# Patient Record
Sex: FEMALE | Race: WHITE | Employment: FULL TIME | URBAN - METROPOLITAN AREA
[De-identification: names, ages, dates, MRNs, and addresses within clinical notes are randomized per-mention and may not be internally consistent; named-entity substitution may affect disease eponyms.]

---

## 2018-02-28 ENCOUNTER — HOSPITAL ENCOUNTER (OUTPATIENT)
Dept: MAMMOGRAPHY | Age: 43
Discharge: HOME | End: 2018-02-28
Attending: FAMILY MEDICINE
Payer: COMMERCIAL

## 2018-02-28 DIAGNOSIS — Z12.31 ENCOUNTER FOR SCREENING MAMMOGRAM FOR MALIGNANT NEOPLASM OF BREAST: ICD-10-CM

## 2018-02-28 PROCEDURE — 77067 SCR MAMMO BI INCL CAD: CPT

## 2018-03-07 ENCOUNTER — HOSPITAL ENCOUNTER (OUTPATIENT)
Dept: MAMMOGRAPHY | Age: 43
Discharge: HOME | End: 2018-03-07
Attending: FAMILY MEDICINE
Payer: COMMERCIAL

## 2018-03-07 DIAGNOSIS — Z80.3 FH: BREAST CANCER: ICD-10-CM

## 2018-03-07 DIAGNOSIS — R92.8 ABNORMAL SCREENING MAMMOGRAM: ICD-10-CM

## 2018-03-07 PROCEDURE — 77065 DX MAMMO INCL CAD UNI: CPT

## 2018-03-07 PROCEDURE — 76642 ULTRASOUND BREAST LIMITED: CPT

## 2018-09-05 PROBLEM — I10 ESSENTIAL HYPERTENSION: Status: ACTIVE | Noted: 2018-09-05

## 2019-02-20 PROBLEM — Z79.890 HORMONE REPLACEMENT THERAPY: Status: ACTIVE | Noted: 2019-02-20

## 2019-03-05 PROBLEM — N20.1 LEFT URETERAL STONE: Status: ACTIVE | Noted: 2019-02-22

## 2019-04-04 ENCOUNTER — HOSPITAL ENCOUNTER (OUTPATIENT)
Dept: MAMMOGRAPHY | Age: 44
Discharge: HOME | End: 2019-04-04
Attending: FAMILY MEDICINE
Payer: COMMERCIAL

## 2019-04-04 DIAGNOSIS — Z12.31 VISIT FOR SCREENING MAMMOGRAM: ICD-10-CM

## 2019-04-04 PROCEDURE — 77067 SCR MAMMO BI INCL CAD: CPT

## 2020-06-24 ENCOUNTER — HOSPITAL ENCOUNTER (OUTPATIENT)
Dept: MAMMOGRAPHY | Age: 45
Discharge: HOME | End: 2020-06-24
Attending: FAMILY MEDICINE
Payer: COMMERCIAL

## 2020-06-24 DIAGNOSIS — Z12.31 VISIT FOR SCREENING MAMMOGRAM: ICD-10-CM

## 2020-06-24 PROCEDURE — 77067 SCR MAMMO BI INCL CAD: CPT

## 2020-07-01 ENCOUNTER — HOSPITAL ENCOUNTER (OUTPATIENT)
Dept: MAMMOGRAPHY | Age: 45
Discharge: HOME | End: 2020-07-01
Attending: FAMILY MEDICINE
Payer: COMMERCIAL

## 2020-07-01 DIAGNOSIS — R92.8 ABNORMAL SCREENING MAMMOGRAM: ICD-10-CM

## 2020-07-01 DIAGNOSIS — Z80.3 FH: BREAST CANCER: ICD-10-CM

## 2020-07-01 PROCEDURE — 76642 ULTRASOUND BREAST LIMITED: CPT

## 2020-07-01 PROCEDURE — 77065 DX MAMMO INCL CAD UNI: CPT

## 2020-09-25 ENCOUNTER — HOSPITAL ENCOUNTER (OUTPATIENT)
Dept: CT IMAGING | Age: 45
Discharge: HOME | End: 2020-09-25
Attending: SURGERY
Payer: COMMERCIAL

## 2020-09-25 DIAGNOSIS — R10.31 RIGHT LOWER QUADRANT ABDOMINAL PAIN: ICD-10-CM

## 2020-09-25 LAB — CREAT BLD-MCNC: 0.6 MG/DL (ref 0.8–1.5)

## 2020-09-25 PROCEDURE — 74011000636 HC RX REV CODE- 636: Performed by: SURGERY

## 2020-09-25 PROCEDURE — 74011000258 HC RX REV CODE- 258: Performed by: SURGERY

## 2020-09-25 PROCEDURE — 82565 ASSAY OF CREATININE: CPT

## 2020-09-25 PROCEDURE — 74177 CT ABD & PELVIS W/CONTRAST: CPT

## 2020-09-25 RX ORDER — SODIUM CHLORIDE 0.9 % (FLUSH) 0.9 %
10 SYRINGE (ML) INJECTION
Status: COMPLETED | OUTPATIENT
Start: 2020-09-25 | End: 2020-09-25

## 2020-09-25 RX ADMIN — SODIUM CHLORIDE 100 ML: 900 INJECTION, SOLUTION INTRAVENOUS at 13:00

## 2020-09-25 RX ADMIN — DIATRIZOATE MEGLUMINE AND DIATRIZOATE SODIUM 15 ML: 660; 100 LIQUID ORAL; RECTAL at 13:00

## 2020-09-25 RX ADMIN — IOPAMIDOL 100 ML: 755 INJECTION, SOLUTION INTRAVENOUS at 13:00

## 2020-09-25 RX ADMIN — Medication 10 ML: at 13:00

## 2020-11-11 NOTE — H&P
aDate: 2020      Name: Sharonda Rosales      MRN: [id number]       : 1975       Age: 50 y.o. Sex: female        Familia Teran MD       CC:  No chief complaint on file. HPI:     Sharonda Rosales is a 50 y.o. female who presents for follow up after a CT scan was done. The CT scan done on 20 showed:    Clinical History: The Female patient is 50years old  presenting with symptoms  of right lower quadrant pain.     Technique: Thin slice axial images were obtained through the abdomen and pelvis with  intravenous and with oral contrast.  Coronal reformatted images were also  provided for review. A total of 100 ml of Iopamidol (ISOVUE-370) 76 % contrast was administered  intravenously.     All CT scans at this facility are performed using dose reduction/dose modulation  techniques, as appropriate the performed exam, including the following:   Automated Exposure Control; Adjustment of the mA and/or kV according to patient  size (this includes techniques or standardized protocols for targeted exams  where dose is matched to indication/reason for exam); and Use of Iterative  Reconstruction Technique.     Radiation Exposure Indices:  Reference Air Kerma (Ka,r) = 573 mGy-cm     COMPARISON:  Abdomen pelvis CT 10/1/2007.     FINDINGS:       Abdomen:  Lung bases: Clear without evidence of focal infiltrate, consolidation, or  effusion.     Liver: Normal size, contour, density and enhancement without focal mass.     Biliary: Unremarkable gallbladder. No evidence of intra or extrahepatic biliary  dilatation.     Pancreas: Normal in size and contour without focal lesion.     Spleen: Normal in size and contour without focal lesion.     Adrenal glands: Normal in size without focal lesion.     Kidneys: Symmetric without evidence of hydronephrosis or nephrolithiasis. Normal  enhancement throughout all visualized phases of contrast excretion.     Bowel: No evidence of obstruction or focal lesion.  Normal appendix     Retroperitoneum/vasculature: No evidence of significant adenopathy. Unremarkable  aorta and IVC.     Abdominal soft tissues: Unremarkable.     Osseous structures: No acute osseous abnormality.     Pelvis:  Unremarkable bladder. Hysterectomy.           IMPRESSION  IMPRESSION:     1. Normal appendix without evidence of hydronephrosis or nephrolithiasis. 2. Previous hysterectomy. The patient has had RLQ pain for the past 9 years after a hysterectomy. She continues to have pain. She would like to \"get to the bottom of this. \"    PMH:    Past Medical History:   Diagnosis Date    Allergic rhinitis, cause unspecified     Essential hypertension, benign     GERD (gastroesophageal reflux disease)     Hearing loss 1995    right    Nicotine addiction     Pure hypercholesterolemia     Ureteral calculus     Vitamin D deficiency        PSH:    Past Surgical History:   Procedure Laterality Date    HX LITHOTRIPSY      HX PARTIAL HYSTERECTOMY      HX TONSILLECTOMY         MEDS:    No current facility-administered medications for this encounter. Current Outpatient Medications   Medication Sig    methylphenidate ER 18 mg 24 hr tab Take 1 Tab by mouth daily for 30 days. Max Daily Amount: 18 mg.    methylphenidate ER 18 mg 24 hr tab Take 1 Tab by mouth daily for 30 days. Max Daily Amount: 18 mg.    methylphenidate ER 18 mg 24 hr tab Take 1 Tab by mouth daily for 30 days. Max Daily Amount: 18 mg.    lisinopriL (PRINIVIL, ZESTRIL) 10 mg tablet Take 1 Tab by mouth daily.  escitalopram oxalate (LEXAPRO) 5 mg tablet Take 1 Tab by mouth daily.  valACYclovir (Valtrex) 500 mg tablet Take 1 Tab by mouth two (2) times a day.  ipratropium-albuteroL (Combivent Respimat)  mcg/actuation inhaler Take 1 Puff by inhalation every six (6) hours.  EPINEPHrine (EPIPEN) 0.3 mg/0.3 mL (1:1,000) injection 0.3 mg by IntraMUSCular route once as needed.  cetirizine (ZYRTEC) 10 mg tablet Take  by mouth.     cholecalciferol (VITAMIN D3) 1,000 unit tablet Take  by mouth daily.  B.infantis-B.ani-B.long-B.bifi (PROBIOTIC 4X) 10-15 mg TbEC Take  by mouth. ALLERGIES:      Allergies   Allergen Reactions    Codeine Unknown (comments)       SH:    Social History     Tobacco Use    Smoking status: Former Smoker     Last attempt to quit: 2013     Years since quittin.8    Smokeless tobacco: Never Used   Substance Use Topics    Alcohol use: Yes     Alcohol/week: 0.0 standard drinks    Drug use: Not on file       FH:    Family History   Problem Relation Age of Onset    Alcohol abuse Father     Heart Failure Father     Cancer Other         Breast    Breast Cancer Paternal Grandmother 76    Breast Cancer Paternal Aunt 43       ROS: The patient has no difficulty with chest pain or shortness of breath. No fever or chills. Comprehensive 13 point review of systems was otherwise unremarkable except as noted above. Physical Exam:     There were no vitals taken for this visit. General: Alert, oriented, cooperative white female in no acute distress. Eyes: Sclera are clear. Conjunctiva and lids within normal limits. No icterus. Ears and Nose: no gross deformities to visual inspection, gross hearing intact  Neck: Supple, trachea midline, no appreciable thyromegaly  Resp: Breathing is  non-labored. Lungs clear to auscultation without wheezing or rhonchi   CV: RRR. No murmurs, rubs or gallops appreciated. Abd: soft, mild RLQ tenderness, active BS'S. Psych:  Mood and affect appropriate. Short-term memory and understanding intact      Assessment/Plan:  Sharonda Rosales is a 50 y.o. female who has signs and symptoms consistent with persistent RLQ pain. 1. Diagnostic laparoscopy, possible open procedure, lysis of adhesions and possible hernia repair.     I went over the risks of bleeding, infection, anesthesia, injury to the small bowel, large bowel, bladder and neurovascular structures in the groin area as well as the potential need to convert to an open procedure. Another potential problem mentioned was the risk of recurrence of the hernia . I went over the use of mesh. I highlighted the importance of following the post-op instructions, particularly the lifting restrictions. The patient understood the risks and wished to proceed.      Rasheed Matt MD     [facility]   11/11/2020  3:18 PM - - -

## 2020-11-12 ENCOUNTER — HOSPITAL ENCOUNTER (OUTPATIENT)
Dept: SURGERY | Age: 45
Discharge: HOME | End: 2020-11-12

## 2020-11-12 VITALS — WEIGHT: 165 LBS | HEIGHT: 62 IN | BODY MASS INDEX: 30.36 KG/M2

## 2020-11-12 NOTE — PERIOP NOTES
Patient verified name and . Order for consent found in EHR and matches case posting; patient verifies procedure. Type 2 surgery, PAT PHONE assessment complete. Orders received. Labs per surgeon: None. Labs per anesthesia protocol: Hgb; order signed and held in EHR. Patient instructed to come to outpatient lab, [facility] [address], any weekday, prior to surgery, between 0800 and 1600 to have lab work completed. Patient verbalized understanding. Patient answered medical/surgical history questions at their best of ability. All prior to admission medications documented in [facility]. Patient instructed to take the following medications the day of surgery according to anesthesia guidelines with a small sip of water: Methylphenidate, Zyrtec PRN, Valtrex PRN, Inhaler PRN-instructed to bring inhaler DOS. Hold all vitamins, supplements, herbals 7 days prior to surgery and NSAIDS/ASA 5 days prior to surgery. Patient instructed on the following:    > a negative Covid swab result is required to proceed with surgery;  appointments are made by the surgeon office and test should be collected 7 days prior to surgery. The testing center is located at the [address] [place] [address].   > 1 visitor allowed at this time. >Arrive at [facility], time of arrival to be called the day before by 1700  >NPO after midnight including gum, mints, and ice chips  >Responsible adult must drive patient to the hospital, stay during surgery, and patient will need supervision 24 hours after anesthesia  >Use anti-bacterial soap in shower the night before surgery and on the morning of surgery  >All piercings must be removed prior to arrival.    >Leave all valuables (money and jewelry) at home but bring insurance card and ID on       DOS. >Do not wear make-up, nail polish, lotions, cologne, perfumes, powders, or oil on skin.

## 2020-11-16 ENCOUNTER — HOSPITAL ENCOUNTER (OUTPATIENT)
Dept: LAB | Age: 45
Discharge: HOME | End: 2020-11-16
Payer: COMMERCIAL

## 2020-11-16 LAB — HGB BLD-MCNC: 13.4 G/DL (ref 11.7–15.4)

## 2020-11-16 PROCEDURE — 85018 HEMOGLOBIN: CPT

## 2020-11-16 PROCEDURE — 36415 COLL VENOUS BLD VENIPUNCTURE: CPT

## 2020-11-16 NOTE — PERIOP NOTES
Lab results within limits per anesthesia protocol; OK for surgery.      Recent Results (from the past 12 hour(s))   HEMOGLOBIN    Collection Time: 11/16/20  1:23 PM   Result Value Ref Range    HGB 13.4 11.7 - 15.4 g/dL

## 2020-11-17 ENCOUNTER — ANESTHESIA EVENT (OUTPATIENT)
Dept: SURGERY | Age: 45
End: 2020-11-17
Payer: COMMERCIAL

## 2020-11-18 ENCOUNTER — ANESTHESIA (OUTPATIENT)
Dept: SURGERY | Age: 45
End: 2020-11-18
Payer: COMMERCIAL

## 2020-11-18 ENCOUNTER — HOSPITAL ENCOUNTER (OUTPATIENT)
Age: 45
Setting detail: OUTPATIENT SURGERY
Discharge: HOME | End: 2020-11-18
Attending: SURGERY | Admitting: SURGERY
Payer: COMMERCIAL

## 2020-11-18 VITALS
OXYGEN SATURATION: 95 % | WEIGHT: 168.1 LBS | DIASTOLIC BLOOD PRESSURE: 65 MMHG | HEART RATE: 51 BPM | TEMPERATURE: 98.3 F | HEIGHT: 62 IN | RESPIRATION RATE: 16 BRPM | BODY MASS INDEX: 30.93 KG/M2 | SYSTOLIC BLOOD PRESSURE: 111 MMHG

## 2020-11-18 DIAGNOSIS — R10.31 RIGHT LOWER QUADRANT ABDOMINAL PAIN: Primary | ICD-10-CM

## 2020-11-18 PROCEDURE — 77030008518 HC TBNG INSUF ENDO STRY -B: Performed by: SURGERY

## 2020-11-18 PROCEDURE — 74011250636 HC RX REV CODE- 250/636: Performed by: ANESTHESIOLOGY

## 2020-11-18 PROCEDURE — 77030018352 HC SHR COAG HARM2 J&J -E: Performed by: SURGERY

## 2020-11-18 PROCEDURE — 88304 TISSUE EXAM BY PATHOLOGIST: CPT

## 2020-11-18 PROCEDURE — 74011250636 HC RX REV CODE- 250/636: Performed by: SURGERY

## 2020-11-18 PROCEDURE — 76210000020 HC REC RM PH II FIRST 0.5 HR: Performed by: SURGERY

## 2020-11-18 PROCEDURE — 44970 LAPAROSCOPY APPENDECTOMY: CPT | Performed by: SURGERY

## 2020-11-18 PROCEDURE — 74011250637 HC RX REV CODE- 250/637: Performed by: ANESTHESIOLOGY

## 2020-11-18 PROCEDURE — 77030039425 HC BLD LARYNG TRULITE DISP TELE -A: Performed by: NURSE ANESTHETIST, CERTIFIED REGISTERED

## 2020-11-18 PROCEDURE — 77030008522 HC TBNG INSUF LAPRO STRY -B: Performed by: SURGERY

## 2020-11-18 PROCEDURE — 77030021158 HC TRCR BLN GELPRT AMR -B: Performed by: SURGERY

## 2020-11-18 PROCEDURE — 74011000250 HC RX REV CODE- 250: Performed by: SURGERY

## 2020-11-18 PROCEDURE — 77030040830 HC CATH URETH FOL MDII -A: Performed by: SURGERY

## 2020-11-18 PROCEDURE — 77030012770 HC TRCR OPT FX AMR -B: Performed by: SURGERY

## 2020-11-18 PROCEDURE — 74011000250 HC RX REV CODE- 250: Performed by: ANESTHESIOLOGY

## 2020-11-18 PROCEDURE — 77030037088 HC TUBE ENDOTRACH ORAL NSL COVD-A: Performed by: NURSE ANESTHETIST, CERTIFIED REGISTERED

## 2020-11-18 PROCEDURE — 74011250636 HC RX REV CODE- 250/636: Performed by: NURSE ANESTHETIST, CERTIFIED REGISTERED

## 2020-11-18 PROCEDURE — 77030007955 HC PCH ENDOSC SPEC J&J -B: Performed by: SURGERY

## 2020-11-18 PROCEDURE — 77030034154 HC SHR COAG HARM ACE J&J -F: Performed by: SURGERY

## 2020-11-18 PROCEDURE — 77030002933 HC SUT MCRYL J&J -A: Performed by: SURGERY

## 2020-11-18 PROCEDURE — 77030008606 HC TRCR ENDOSC KII AMR -B: Performed by: SURGERY

## 2020-11-18 PROCEDURE — 77030036733 HC ENDOLP LIG VCRL SUT J&J -C: Performed by: SURGERY

## 2020-11-18 PROCEDURE — 77030031139 HC SUT VCRL2 J&J -A: Performed by: SURGERY

## 2020-11-18 PROCEDURE — 77030020829: Performed by: SURGERY

## 2020-11-18 PROCEDURE — 74011000250 HC RX REV CODE- 250: Performed by: NURSE ANESTHETIST, CERTIFIED REGISTERED

## 2020-11-18 PROCEDURE — 77030010507 HC ADH SKN DERMBND J&J -B: Performed by: SURGERY

## 2020-11-18 PROCEDURE — 77030008608 HC TRCR ENDOSC SMTH AMR -B: Performed by: SURGERY

## 2020-11-18 PROCEDURE — 76060000032 HC ANESTHESIA 0.5 TO 1 HR: Performed by: SURGERY

## 2020-11-18 PROCEDURE — 2709999900 HC NON-CHARGEABLE SUPPLY: Performed by: SURGERY

## 2020-11-18 PROCEDURE — 76210000006 HC OR PH I REC 0.5 TO 1 HR: Performed by: SURGERY

## 2020-11-18 PROCEDURE — 76010000160 HC OR TIME 0.5 TO 1 HR INTENSV-TIER 1: Performed by: SURGERY

## 2020-11-18 RX ORDER — LIDOCAINE HYDROCHLORIDE 10 MG/ML
0.1 INJECTION INFILTRATION; PERINEURAL AS NEEDED
Status: DISCONTINUED | OUTPATIENT
Start: 2020-11-18 | End: 2020-11-18 | Stop reason: HOSPADM

## 2020-11-18 RX ORDER — KETOROLAC TROMETHAMINE 30 MG/ML
INJECTION, SOLUTION INTRAMUSCULAR; INTRAVENOUS AS NEEDED
Status: DISCONTINUED | OUTPATIENT
Start: 2020-11-18 | End: 2020-11-18 | Stop reason: HOSPADM

## 2020-11-18 RX ORDER — SODIUM CHLORIDE, SODIUM LACTATE, POTASSIUM CHLORIDE, CALCIUM CHLORIDE 600; 310; 30; 20 MG/100ML; MG/100ML; MG/100ML; MG/100ML
100 INJECTION, SOLUTION INTRAVENOUS CONTINUOUS
Status: DISCONTINUED | OUTPATIENT
Start: 2020-11-18 | End: 2020-11-18 | Stop reason: HOSPADM

## 2020-11-18 RX ORDER — NALOXONE HYDROCHLORIDE 0.4 MG/ML
0.04 INJECTION, SOLUTION INTRAMUSCULAR; INTRAVENOUS; SUBCUTANEOUS
Status: DISCONTINUED | OUTPATIENT
Start: 2020-11-18 | End: 2020-11-18 | Stop reason: HOSPADM

## 2020-11-18 RX ORDER — MIDAZOLAM HYDROCHLORIDE 1 MG/ML
2 INJECTION, SOLUTION INTRAMUSCULAR; INTRAVENOUS
Status: COMPLETED | OUTPATIENT
Start: 2020-11-18 | End: 2020-11-18

## 2020-11-18 RX ORDER — LIDOCAINE HYDROCHLORIDE 20 MG/ML
INJECTION, SOLUTION EPIDURAL; INFILTRATION; INTRACAUDAL; PERINEURAL AS NEEDED
Status: DISCONTINUED | OUTPATIENT
Start: 2020-11-18 | End: 2020-11-18 | Stop reason: HOSPADM

## 2020-11-18 RX ORDER — PROPOFOL 10 MG/ML
INJECTION, EMULSION INTRAVENOUS AS NEEDED
Status: DISCONTINUED | OUTPATIENT
Start: 2020-11-18 | End: 2020-11-18 | Stop reason: HOSPADM

## 2020-11-18 RX ORDER — FENTANYL CITRATE 50 UG/ML
INJECTION, SOLUTION INTRAMUSCULAR; INTRAVENOUS AS NEEDED
Status: DISCONTINUED | OUTPATIENT
Start: 2020-11-18 | End: 2020-11-18 | Stop reason: HOSPADM

## 2020-11-18 RX ORDER — OXYCODONE AND ACETAMINOPHEN 5; 325 MG/1; MG/1
1-2 TABLET ORAL
Qty: 30 TAB | Refills: 0 | Status: SHIPPED | OUTPATIENT
Start: 2020-11-18 | End: 2020-11-21

## 2020-11-18 RX ORDER — MIDAZOLAM HYDROCHLORIDE 1 MG/ML
2 INJECTION, SOLUTION INTRAMUSCULAR; INTRAVENOUS ONCE
Status: DISCONTINUED | OUTPATIENT
Start: 2020-11-18 | End: 2020-11-18 | Stop reason: HOSPADM

## 2020-11-18 RX ORDER — ACETAMINOPHEN 500 MG
1000 TABLET ORAL ONCE
Status: COMPLETED | OUTPATIENT
Start: 2020-11-18 | End: 2020-11-18

## 2020-11-18 RX ORDER — GLYCOPYRROLATE 0.2 MG/ML
INJECTION INTRAMUSCULAR; INTRAVENOUS AS NEEDED
Status: DISCONTINUED | OUTPATIENT
Start: 2020-11-18 | End: 2020-11-18 | Stop reason: HOSPADM

## 2020-11-18 RX ORDER — CEFAZOLIN SODIUM/WATER 2 G/20 ML
2 SYRINGE (ML) INTRAVENOUS
Status: COMPLETED | OUTPATIENT
Start: 2020-11-18 | End: 2020-11-18

## 2020-11-18 RX ORDER — OXYCODONE HYDROCHLORIDE 5 MG/1
5 TABLET ORAL
Status: DISCONTINUED | OUTPATIENT
Start: 2020-11-18 | End: 2020-11-18 | Stop reason: HOSPADM

## 2020-11-18 RX ORDER — DEXAMETHASONE SODIUM PHOSPHATE 4 MG/ML
INJECTION, SOLUTION INTRA-ARTICULAR; INTRALESIONAL; INTRAMUSCULAR; INTRAVENOUS; SOFT TISSUE AS NEEDED
Status: DISCONTINUED | OUTPATIENT
Start: 2020-11-18 | End: 2020-11-18 | Stop reason: HOSPADM

## 2020-11-18 RX ORDER — NEOSTIGMINE METHYLSULFATE 1 MG/ML
INJECTION, SOLUTION INTRAVENOUS AS NEEDED
Status: DISCONTINUED | OUTPATIENT
Start: 2020-11-18 | End: 2020-11-18 | Stop reason: HOSPADM

## 2020-11-18 RX ORDER — BUPIVACAINE HYDROCHLORIDE 5 MG/ML
INJECTION, SOLUTION EPIDURAL; INTRACAUDAL AS NEEDED
Status: DISCONTINUED | OUTPATIENT
Start: 2020-11-18 | End: 2020-11-18 | Stop reason: HOSPADM

## 2020-11-18 RX ORDER — ONDANSETRON 2 MG/ML
INJECTION INTRAMUSCULAR; INTRAVENOUS AS NEEDED
Status: DISCONTINUED | OUTPATIENT
Start: 2020-11-18 | End: 2020-11-18 | Stop reason: HOSPADM

## 2020-11-18 RX ORDER — FENTANYL CITRATE 50 UG/ML
100 INJECTION, SOLUTION INTRAMUSCULAR; INTRAVENOUS ONCE
Status: DISCONTINUED | OUTPATIENT
Start: 2020-11-18 | End: 2020-11-18 | Stop reason: HOSPADM

## 2020-11-18 RX ORDER — HYDROMORPHONE HYDROCHLORIDE 2 MG/ML
0.5 INJECTION, SOLUTION INTRAMUSCULAR; INTRAVENOUS; SUBCUTANEOUS
Status: DISCONTINUED | OUTPATIENT
Start: 2020-11-18 | End: 2020-11-18 | Stop reason: HOSPADM

## 2020-11-18 RX ORDER — ROCURONIUM BROMIDE 10 MG/ML
INJECTION, SOLUTION INTRAVENOUS AS NEEDED
Status: DISCONTINUED | OUTPATIENT
Start: 2020-11-18 | End: 2020-11-18 | Stop reason: HOSPADM

## 2020-11-18 RX ADMIN — ONDANSETRON 4 MG: 2 INJECTION INTRAMUSCULAR; INTRAVENOUS at 14:00

## 2020-11-18 RX ADMIN — PROPOFOL 10 MG: 10 INJECTION, EMULSION INTRAVENOUS at 13:48

## 2020-11-18 RX ADMIN — FENTANYL CITRATE 100 MCG: 50 INJECTION INTRAMUSCULAR; INTRAVENOUS at 13:26

## 2020-11-18 RX ADMIN — Medication 3 MG: at 14:00

## 2020-11-18 RX ADMIN — Medication 2 G: at 13:35

## 2020-11-18 RX ADMIN — MIDAZOLAM 2 MG: 1 INJECTION INTRAMUSCULAR; INTRAVENOUS at 11:57

## 2020-11-18 RX ADMIN — SODIUM CHLORIDE, SODIUM LACTATE, POTASSIUM CHLORIDE, AND CALCIUM CHLORIDE 100 ML/HR: 600; 310; 30; 20 INJECTION, SOLUTION INTRAVENOUS at 11:46

## 2020-11-18 RX ADMIN — ROCURONIUM BROMIDE 40 MG: 10 INJECTION, SOLUTION INTRAVENOUS at 13:27

## 2020-11-18 RX ADMIN — PROPOFOL 30 MG: 10 INJECTION, EMULSION INTRAVENOUS at 13:43

## 2020-11-18 RX ADMIN — KETOROLAC TROMETHAMINE 30 MG: 30 INJECTION, SOLUTION INTRAMUSCULAR; INTRAVENOUS at 14:00

## 2020-11-18 RX ADMIN — Medication 1 MG: at 14:07

## 2020-11-18 RX ADMIN — PROPOFOL 200 MG: 10 INJECTION, EMULSION INTRAVENOUS at 13:27

## 2020-11-18 RX ADMIN — DEXAMETHASONE SODIUM PHOSPHATE 5 MG: 4 INJECTION, SOLUTION INTRAMUSCULAR; INTRAVENOUS at 13:35

## 2020-11-18 RX ADMIN — PROMETHAZINE HYDROCHLORIDE 6.25 MG: 25 INJECTION INTRAMUSCULAR; INTRAVENOUS at 14:28

## 2020-11-18 RX ADMIN — ACETAMINOPHEN 1000 MG: 500 TABLET, FILM COATED ORAL at 11:41

## 2020-11-18 RX ADMIN — GLYCOPYRROLATE 0.4 MG: 0.2 INJECTION, SOLUTION INTRAMUSCULAR; INTRAVENOUS at 14:00

## 2020-11-18 RX ADMIN — HYDROMORPHONE HYDROCHLORIDE 0.5 MG: 2 INJECTION INTRAMUSCULAR; INTRAVENOUS; SUBCUTANEOUS at 14:37

## 2020-11-18 RX ADMIN — SODIUM CHLORIDE, SODIUM LACTATE, POTASSIUM CHLORIDE, AND CALCIUM CHLORIDE: 600; 310; 30; 20 INJECTION, SOLUTION INTRAVENOUS at 14:11

## 2020-11-18 RX ADMIN — LIDOCAINE HYDROCHLORIDE 70 MG: 20 INJECTION, SOLUTION EPIDURAL; INFILTRATION; INTRACAUDAL; PERINEURAL at 13:26

## 2020-11-18 NOTE — ANESTHESIA POSTPROCEDURE EVALUATION
Procedure(s):  LAPAROSCOPY GENERAL DIAGNOSTIC WITH LYSIS OF ADHESIONS  APPENDECTOMY. general    Anesthesia Post Evaluation      Multimodal analgesia: multimodal analgesia used between 6 hours prior to anesthesia start to PACU discharge  Patient location during evaluation: PACU  Patient participation: complete - patient participated  Level of consciousness: awake  Pain management: adequate  Airway patency: patent  Anesthetic complications: no  Cardiovascular status: acceptable and hemodynamically stable  Respiratory status: acceptable  Hydration status: acceptable  Comments: Acceptable for discharge from PACU.   Post anesthesia nausea and vomiting:  none  Final Post Anesthesia Temperature Assessment:  Normothermia (36.0-37.5 degrees C)      INITIAL Post-op Vital signs:   Vitals Value Taken Time   /64 11/18/2020  2:45 PM   Temp 36.8 °C (98.3 °F) 11/18/2020  2:20 PM   Pulse 68 11/18/2020  2:45 PM   Resp 16 11/18/2020  2:45 PM   SpO2 97 % 11/18/2020  2:45 PM

## 2020-11-18 NOTE — OP NOTES
[address] [address]  OPERATIVE REPORT    Name:  Glo Rosenthal  MR#:  [id number]  :  1975  ACCOUNT #:  [de-identified]  DATE OF SERVICE:  2020    PREOPERATIVE DIAGNOSIS:  Chronic right lower quadrant pain, which she has had for 9 years after a hysterectomy. POSTOPERATIVE DIAGNOSES:  Adhesions in the right lower quadrant area. She also had large left ovarian cyst, which was hemorrhagic. PROCEDURES PERFORMED:  Diagnostic laparoscopy, laparoscopic lysis of adhesions, and laparoscopic appendectomy. SURGEON:  Monster Mcgovern. Alicia Lee MD    ASSISTANT:  None. ANESTHESIA:  General endotracheal anesthesia with Dr. Estuardo Longoria. COMPLICATIONS:  None. SPECIMENS REMOVED:  Appendix. IMPLANTS:  None. ESTIMATED BLOOD LOSS:  10 mL. DRAINS:  None. HISTORY:  This is a 49-year-old female, who was referred by Dr. Jagjit Tan. She has had hysterectomy 9 years ago and has had chronic right lower quadrant pain. She felt like she had a hernia in the area. I could not appreciate a hernia with her supine or standing using a Valsalva and coughing as well. She has been complaining of this pain for 9 years. I have recommended a diagnostic laparoscopy, as I suspected adhesions due to her previous hysterectomy as potential source of her abdominal pain. She was agreeable, signed a consent form and was scheduled for today. I did go through risks of bleeding, infection, anesthesia, injury to the bladder, neurovascular structures, both groin area, small bowel, large bowel, potential injury to either ureter. The patient was agreeable, signed a consent form and was scheduled for today. PROCEDURE:  The patient was seen in the preop area with her , then transported to room #3 [address] [place] [address].  She was placed on the operating room table in the supine position. General endotracheal anesthesia was administered without complication. She received 2 g of Ancef as prophylactic antibiotic coverage. Her nursing staff inserted a Viera catheter. Sequential compression devices were placed and used throughout the procedure. Time-out was done identifying the patient, surgeon, procedure, and her birth date of 1975. When everyone agreed with the time-out, I began the procedure. I made an incision in the right upper quadrant to the right of the midline overlying the right rectus abdominis muscle. We went through with a 12 mm optical trocar and a 10 mm 0- degree scope. We went through the appropriate layers of the anterior abdominal wall until we entered the peritoneal cavity. The peritoneal cavity was insufflated to 15 mmHg using carbon dioxide gas after which a 10 mm 30-degree scope was inserted. I placed two 5 mm trocars in the left side of the abdomen, one at the level of the umbilicus, one in the left lower quadrant. One of the incisions had been used from her previous laparoscopic surgery. There were adhesions down in the right lower quadrant between the cecum and the anterior abdominal wall as well as between the ascending colon and the anterior abdominal wall and the appendix and the anterior abdominal wall. I took all these down. She had no evidence of inguinal or femoral hernias noted. I was able to run the bowel and there were no adhesions between loops of bowel. She did have one ovary, a large hemorrhagic cyst with some bloody fluid in the pelvis on the left-hand side. There were some adhesions between the sigmoid colon and the anterior abdominal wall. These were taken down with a 5 mm harmonic scalpel as were all the other adhesions done with the 5 mm harmonic scalpel. We removed the appendix by dividing the mesoappendix as to eliminate the appendix as the source of pain in the future. I divided the mesoappendix with the 5 mm harmonic scalpel. I placed two Endoloops at the base of the appendix and divided distal to the Endoloops. Appendix was then placed into an Endopouch.   The 10 mm 30-degree scope was switched to a 5 mm 30-degree scope that was placed through the 5 mm trocar at the level of the umbilicus and left side of the abdomen. Endopouch was placed through the 12 mm trocar. Appendix placed within the Endopouch. Endopouch closed and withdrawn through the right upper quadrant 12 mm trocar site. The appendix was removed and sent to Pathology for evaluation. The 12 mm trocar was returned to the right upper quadrant incision site. We then checked the area where we done the lysis of adhesions. There was no bleeding. There was no fecal material coming from the appendiceal stump. Everything appeared to be normal.  There were two Endoloops on the appendiceal stump. At this point, both 5 mm trocars were removed with no evidence of bleeding from the trocar sites. The 12 mm trocar was removed. I injected 30 mL of 0.5% Marcaine in divided doses to the three incision sites. I closed each of the three incision sites with a subcuticular suture of 4-0 Monocryl. Mastisol and Steri-Strips were placed over the wounds. The patient tolerated the procedure well, was extubated, and brought to recovery room in stable condition.       Catha Opitz, MD [id number]  D:  11/18/2020 14:10  T:  11/18/2020 15:50  JOB #:  [id number]

## 2020-11-18 NOTE — INTERVAL H&P NOTE
Update History & Physical 
 
The Patient's History and Physical of 11/11/20 was reviewed with the patient and I examined the patient. There was no change. The surgical site was confirmed by the patient and me. Plan:  The risk, benefits, expected outcome, and alternative to the recommended procedure have been discussed with the patient. Patient understands and wants to proceed with the procedure.  
 
Electronically signed by Ethan Abdul MD on 11/18/2020 at 11:14 AM

## 2020-11-18 NOTE — BRIEF OP NOTE
Brief Postoperative Note    Patient: Dewey Cantor  YOB: 1975  MRN: [id number]    Date of Procedure: 11/18/2020     Pre-Op Diagnosis: RLQ abdominal pain [R10.31]    Post-Op Diagnosis: Adhesions     Procedure(s):  LAPAROSCOPY GENERAL DIAGNOSTIC WITH LYSIS OF ADHESIONS AND LAPAROSCOPIC  APPENDECTOMY    Surgeon(s): Oleksandr Pabon MD    Surgical Assistant: None    Anesthesia: General plus local    Estimated Blood Loss (mL): less than 50     Complications: None    Specimens:   ID Type Source Tests Collected by Time Destination   1 : Appendix Preservative Appendix  Oleksandr Pabon MD 11/18/2020 1355 Pathology        Implants: * No implants in log *    Drains: * No LDAs found *    Findings: See dictated note.     Electronically Signed by Melissa Miller MD on 11/18/2020 at 2:02 PM

## 2020-11-18 NOTE — DISCHARGE INSTRUCTIONS
1. Diet as tolerated except for a  low fat diet after laparoscopic cholecystectomy. 2. Showering is allowed, but no tub baths, hot tubs or swimming. 3. Drainage is common from the wounds. Change the dressings as needed. Call our office if the wounds become reddened, tender, feel warm to the touch or pus starts to drain from the wound. 4. Take prescribed pain medication as directed, usually Percocet, Norco, Ultram or Dilaudid. Take over the counter medication for minor pain. 5. Ice may be applied intermittently to the surgical site or sites. 6. Call or office, [phone], if problems arise. 7. Follow up in the office at the assigned time. 8. Resume all medications as taken per surgery, unless specifically instructed not to take certain ones. 9. No lifting more than 25 pounds until told otherwise. 10. Driving is allowed 3 days after surgery as long as you feel comfortable enough to drive and have not taken any prescription pain medication prior to driving. After general anesthesia or intravenous sedation, for 24 hours or while taking prescription Narcotics:  · Limit your activities  · A responsible adult needs to be with you for the next 24 hours  · Do not drive and operate hazardous machinery  · Do not make important personal or business decisions  · Do not drink alcoholic beverages  · If you have not urinated within 8 hours after discharge, and you are experiencing discomfort from urinary retention, please go to the nearest ED. · If you have sleep apnea and have a CPAP machine, please use it for all naps and sleeping. · Please use caution when taking narcotics and any of your home medications that may cause drowsiness. *  Please give a list of your current medications to your Primary Care Provider. *  Please update this list whenever your medications are discontinued, doses are      changed, or new medications (including over-the-counter products) are added.   *  Please carry medication information at all times in case of emergency situations. These are general instructions for a healthy lifestyle:  No smoking/ No tobacco products/ Avoid exposure to second hand smoke  Surgeon General's Warning:  Quitting smoking now greatly reduces serious risk to your health. Obesity, smoking, and sedentary lifestyle greatly increases your risk for illness  A healthy diet, regular physical exercise & weight monitoring are important for maintaining a healthy lifestyle    You may be retaining fluid if you have a history of heart failure or if you experience any of the following symptoms:  Weight gain of 3 pounds or more overnight or 5 pounds in a week, increased swelling in our hands or feet or shortness of breath while lying flat in bed. Please call your doctor as soon as you notice any of these symptoms; do not wait until your next office visit.

## 2020-11-18 NOTE — ANESTHESIA PREPROCEDURE EVALUATION
Relevant Problems   No relevant active problems       Anesthetic History   No history of anesthetic complications            Review of Systems / Medical History  Patient summary reviewed and pertinent labs reviewed    Pulmonary  Within defined limits                 Neuro/Psych         Psychiatric history (ADHD, anxiety)     Cardiovascular    Hypertension: well controlled              Exercise tolerance: >4 METS     GI/Hepatic/Renal     GERD: well controlled    Renal disease: stones       Endo/Other  Within defined limits           Other Findings              Physical Exam    Airway  Mallampati: II  TM Distance: 4 - 6 cm  Neck ROM: normal range of motion   Mouth opening: Normal     Cardiovascular    Rhythm: regular  Rate: normal         Dental  No notable dental hx       Pulmonary  Breath sounds clear to auscultation               Abdominal         Other Findings            Anesthetic Plan    ASA: 2  Anesthesia type: general            Anesthetic plan and risks discussed with: Patient and Spouse

## 2021-02-02 PROBLEM — Z98.890 HISTORY OF COLONOSCOPY: Status: ACTIVE | Noted: 2021-02-02

## 2021-06-25 ENCOUNTER — TRANSCRIBE ORDER (OUTPATIENT)
Dept: SCHEDULING | Age: 46
End: 2021-06-25

## 2021-06-25 DIAGNOSIS — Z12.31 VISIT FOR SCREENING MAMMOGRAM: Primary | ICD-10-CM

## 2021-06-30 ENCOUNTER — HOSPITAL ENCOUNTER (OUTPATIENT)
Dept: MAMMOGRAPHY | Age: 46
Discharge: HOME | End: 2021-06-30
Attending: FAMILY MEDICINE
Payer: COMMERCIAL

## 2021-06-30 DIAGNOSIS — Z12.31 VISIT FOR SCREENING MAMMOGRAM: ICD-10-CM

## 2021-06-30 PROCEDURE — 77063 BREAST TOMOSYNTHESIS BI: CPT

## 2022-03-19 PROBLEM — I10 ESSENTIAL HYPERTENSION: Status: ACTIVE | Noted: 2018-09-05

## 2022-03-20 PROBLEM — N20.1 LEFT URETERAL STONE: Status: ACTIVE | Noted: 2019-02-22

## 2022-03-20 PROBLEM — Z98.890 HISTORY OF COLONOSCOPY: Status: ACTIVE | Noted: 2021-02-02

## 2022-03-20 PROBLEM — Z79.890 HORMONE REPLACEMENT THERAPY: Status: ACTIVE | Noted: 2019-02-20

## 2022-07-05 ENCOUNTER — TELEPHONE (OUTPATIENT)
Dept: FAMILY MEDICINE CLINIC | Facility: CLINIC | Age: 47
End: 2022-07-05

## 2022-07-19 DIAGNOSIS — F98.8 ATTENTION DEFICIT DISORDER (ADD) WITHOUT HYPERACTIVITY: Primary | ICD-10-CM

## 2022-07-26 ENCOUNTER — OFFICE VISIT (OUTPATIENT)
Dept: FAMILY MEDICINE CLINIC | Facility: CLINIC | Age: 47
End: 2022-07-26
Payer: COMMERCIAL

## 2022-07-26 VITALS
HEART RATE: 92 BPM | WEIGHT: 162 LBS | BODY MASS INDEX: 29.63 KG/M2 | DIASTOLIC BLOOD PRESSURE: 78 MMHG | OXYGEN SATURATION: 96 % | SYSTOLIC BLOOD PRESSURE: 130 MMHG

## 2022-07-26 DIAGNOSIS — I10 ESSENTIAL HYPERTENSION: Primary | ICD-10-CM

## 2022-07-26 DIAGNOSIS — F31.9 BIPOLAR DISEASE, CHRONIC (HCC): ICD-10-CM

## 2022-07-26 DIAGNOSIS — I10 ESSENTIAL HYPERTENSION: ICD-10-CM

## 2022-07-26 DIAGNOSIS — F41.1 GAD (GENERALIZED ANXIETY DISORDER): ICD-10-CM

## 2022-07-26 DIAGNOSIS — E78.00 PURE HYPERCHOLESTEROLEMIA: ICD-10-CM

## 2022-07-26 DIAGNOSIS — F98.8 ATTENTION DEFICIT DISORDER (ADD) WITHOUT HYPERACTIVITY: ICD-10-CM

## 2022-07-26 LAB
ANION GAP SERPL CALC-SCNC: 5 MMOL/L (ref 7–16)
BUN SERPL-MCNC: 17 MG/DL (ref 6–23)
CALCIUM SERPL-MCNC: 8.7 MG/DL (ref 8.3–10.4)
CHLORIDE SERPL-SCNC: 109 MMOL/L (ref 98–107)
CHOLEST SERPL-MCNC: 186 MG/DL
CO2 SERPL-SCNC: 27 MMOL/L (ref 21–32)
CREAT SERPL-MCNC: 0.6 MG/DL (ref 0.6–1)
GLUCOSE SERPL-MCNC: 119 MG/DL (ref 65–100)
HDLC SERPL-MCNC: 58 MG/DL (ref 40–60)
HDLC SERPL: 3.2 {RATIO}
LDLC SERPL CALC-MCNC: 83.8 MG/DL
POTASSIUM SERPL-SCNC: 3.7 MMOL/L (ref 3.5–5.1)
SODIUM SERPL-SCNC: 141 MMOL/L (ref 136–145)
TRIGL SERPL-MCNC: 221 MG/DL (ref 35–150)
VLDLC SERPL CALC-MCNC: 44.2 MG/DL (ref 6–23)

## 2022-07-26 PROCEDURE — 99214 OFFICE O/P EST MOD 30 MIN: CPT | Performed by: FAMILY MEDICINE

## 2022-07-26 RX ORDER — LISINOPRIL 10 MG/1
10 TABLET ORAL DAILY
Qty: 90 TABLET | Refills: 1 | Status: SHIPPED | OUTPATIENT
Start: 2022-07-26

## 2022-07-26 RX ORDER — ESCITALOPRAM OXALATE 5 MG/1
5 TABLET ORAL DAILY
Qty: 90 TABLET | Refills: 1 | Status: SHIPPED | OUTPATIENT
Start: 2022-07-26

## 2022-07-26 ASSESSMENT — PATIENT HEALTH QUESTIONNAIRE - PHQ9
SUM OF ALL RESPONSES TO PHQ QUESTIONS 1-9: 0
2. FEELING DOWN, DEPRESSED OR HOPELESS: 0
SUM OF ALL RESPONSES TO PHQ9 QUESTIONS 1 & 2: 0
1. LITTLE INTEREST OR PLEASURE IN DOING THINGS: 0
SUM OF ALL RESPONSES TO PHQ QUESTIONS 1-9: 0

## 2022-07-26 NOTE — PROGRESS NOTES
Subjective:  Cedric Hanson is a 48 y.o. female presents today for their semi-annual htn visit. They are having no side effects and are doing well. Systems review of cardiovascular and pulmonary systems reveal no complaints or pertinent positives. Patient denies any pounding heart beats or rapid heart beat intervals, flushing, panic like attacks, headaches, dizzyness or flushing. They have drug reistant hypertension, on 3 or more different medicaitons including one diuretic- No    How much are you exercising? Three days a week  Do you smoke? No  Are you taking your medicines as directed most every day? Yes  Do you follow a low sodium DASH diet or similar high blood pressure diet? Yes  Do you check your bp at home with an automated blood pressure device? Yes  If you don't have a home bp machine, you should purchase one at home and begin to check your pressure at home on a regular basis. Your blood pressure goal is listed under the \"plan section\" below    Allergies   Allergen Reactions    Peanut-Containing Drug Products Anaphylaxis    Codeine Other (See Comments)      reports that she quit smoking about 8 years ago. Her smoking use included cigarettes. She has never used smokeless tobacco.  Current Outpatient Medications   Medication Sig Dispense Refill    [START ON 8/17/2022] Lisdexamfetamine Dimesylate 60 MG CAPS Take 60 mg by mouth daily for 30 days. 30 capsule 0    [START ON 9/16/2022] Lisdexamfetamine Dimesylate 60 MG CAPS Take 60 mg by mouth daily for 30 days. 30 capsule 0    [START ON 10/16/2022] Lisdexamfetamine Dimesylate 60 MG CAPS Take 60 mg by mouth daily for 30 days. 30 capsule 0    lisinopril (PRINIVIL;ZESTRIL) 10 MG tablet Take 1 tablet by mouth in the morning. 90 tablet 1    escitalopram (LEXAPRO) 5 MG tablet Take 1 tablet by mouth in the morning.  90 tablet 1    cetirizine (ZYRTEC) 10 MG tablet Take by mouth      vitamin D (CHOLECALCIFEROL) 25 MCG (1000 UT) TABS tablet Take by mouth daily EPINEPHrine (EPIPEN) 0.3 MG/0.3ML SOAJ injection Inject 0.3 mg into the muscle once as needed      albuterol-ipratropium (COMBIVENT RESPIMAT)  MCG/ACT AERS inhaler Inhale 1 puff into the lungs      valACYclovir (VALTREX) 500 MG tablet Take 500 mg by mouth 2 times daily as needed       No current facility-administered medications for this visit. Lab Results   Component Value Date/Time     02/03/2022 08:34 AM    K 4.1 02/03/2022 08:34 AM     02/03/2022 08:34 AM    CO2 27 02/03/2022 08:34 AM    BUN 14 02/03/2022 08:34 AM    CREATININE 0.71 02/03/2022 08:34 AM    GLUCOSE 126 02/03/2022 08:34 AM    CALCIUM 9.1 02/03/2022 08:34 AM          Objective:  Blood pressure 130/78, pulse 92, weight 162 lb (73.5 kg), SpO2 96 %. Body mass index is 29.63 kg/m². BP Readings from Last 3 Encounters:   07/26/22 130/78   01/31/22 112/60   08/12/21 122/78     General- Pleasant and no distress  Psych- alert and oriented to person, place and time  Mood and affect are appropriate to situation  Musculoskeletal - Gait and station examination reveals mid-position with no abnormalities. Neurological- grossly intact. rrr s mrg.   bcta  extremeties are without edema, and dp, and pt pulses are intact  No carotid bruits   Fundoscopic exam is: benign without retinopathology     Assessment:  1. Essential hypertension    2. Attention deficit disorder (ADD) without hyperactivity    3. Bipolar disease, chronic ([facility] [address]    4. DANTE (generalized anxiety disorder)    5. Pure hypercholesterolemia        Plan:   Prescription drug management occurs today as follows:  Because current regimen for blood pressure is now working and tolerated, will continue medications as listed    Sandrine Omero has been given the following recommendations today due to her elevated BP reading: lab tests ordered. Personal instruction is given.   For your information, consider the following:  Anna Mary is an excellent site that will give you a list of approved blood pressure devices  Targetbp.org is an excellent site that will teach you how to take accurate bp measurements at home. Significant weight loss can result in a drop of 5-10mm blood pressure. A DASH diet (see bookstore or go to www.[employer]gen[employer] and print DASH diet) will lower 8-14mm blood pressure. Salt restriction will lower your bp 2-8mm. Lowering alcohol consumption to moderate use will lower your pressure 2-4mm. Continue efforts to maintain an exercise regimen. Normal blood pressure target is now 120/80. Stage 1 or \"pre-hypertension\" or \"high normal hypertension\" is 130-139/80-89. We sometimes begin treatment with medications. Stage 2 is >140/90 which is when we always begin treatment with medications. For high risk patients such as those with heart disease, a history of stents, angioplasty, heart attacks, strokes, diabetes and chronic kidney disease,your blood pressure goal is 130/80. For lower risk patients without the high risk categories, your goal for blood pressure is 139/89. Unless you are older than 72years old we may try for a systolic bp of 841 or we will accept higher pressures if the lower pressures are not tolerated. 1. Essential hypertension  -     Basic Metabolic Panel; Future  -     Hemoglobin A1C; Future  -     lisinopril (PRINIVIL;ZESTRIL) 10 MG tablet; Take 1 tablet by mouth in the morning., Disp-90 tablet, R-1Normal  2. Attention deficit disorder (ADD) without hyperactivity  -     Hemoglobin A1C; Future  -     Lisdexamfetamine Dimesylate 60 MG CAPS; Take 60 mg by mouth daily for 30 days. , Disp-30 capsule, R-0Normal  -     Lisdexamfetamine Dimesylate 60 MG CAPS; Take 60 mg by mouth daily for 30 days. , Disp-30 capsule, R-0Normal  -     Lisdexamfetamine Dimesylate 60 MG CAPS; Take 60 mg by mouth daily for 30 days. , Disp-30 capsule, R-0Normal  3. Bipolar disease, chronic ([facility] [address]  -     Hemoglobin A1C; Future  4.  DANTE (generalized anxiety disorder)  - Hemoglobin A1C; Future  -     escitalopram (LEXAPRO) 5 MG tablet; Take 1 tablet by mouth in the morning., Disp-90 tablet, R-1Normal  5. Pure hypercholesterolemia  -     Lipid Panel;  Future  -     Hemoglobin A1C; Future    Followup:  Return for 3 mo for ADD check virtual.

## 2022-07-27 LAB
EST. AVERAGE GLUCOSE BLD GHB EST-MCNC: 120 MG/DL
HBA1C MFR BLD: 5.8 % (ref 4.8–5.6)

## 2022-08-01 ENCOUNTER — HOSPITAL ENCOUNTER (OUTPATIENT)
Dept: MAMMOGRAPHY | Age: 47
Discharge: HOME | End: 2022-08-04
Payer: COMMERCIAL

## 2022-08-01 DIAGNOSIS — Z12.31 ENCOUNTER FOR SCREENING MAMMOGRAM FOR MALIGNANT NEOPLASM OF BREAST: ICD-10-CM

## 2022-08-01 PROCEDURE — 77063 BREAST TOMOSYNTHESIS BI: CPT

## 2022-08-06 DIAGNOSIS — I10 ESSENTIAL HYPERTENSION: ICD-10-CM

## 2022-08-08 RX ORDER — LISINOPRIL 10 MG/1
TABLET ORAL
Qty: 30 TABLET | Refills: 0 | OUTPATIENT
Start: 2022-08-08

## 2022-10-17 ENCOUNTER — TELEMEDICINE (OUTPATIENT)
Dept: FAMILY MEDICINE CLINIC | Facility: CLINIC | Age: 47
End: 2022-10-17
Payer: COMMERCIAL

## 2022-10-17 DIAGNOSIS — F98.8 ATTENTION DEFICIT DISORDER (ADD) WITHOUT HYPERACTIVITY: Primary | ICD-10-CM

## 2022-10-17 PROCEDURE — 99213 OFFICE O/P EST LOW 20 MIN: CPT | Performed by: FAMILY MEDICINE

## 2022-10-17 ASSESSMENT — PATIENT HEALTH QUESTIONNAIRE - PHQ9
SUM OF ALL RESPONSES TO PHQ QUESTIONS 1-9: 0
2. FEELING DOWN, DEPRESSED OR HOPELESS: 0
SUM OF ALL RESPONSES TO PHQ QUESTIONS 1-9: 0
1. LITTLE INTEREST OR PLEASURE IN DOING THINGS: 0
SUM OF ALL RESPONSES TO PHQ QUESTIONS 1-9: 0
SUM OF ALL RESPONSES TO PHQ9 QUESTIONS 1 & 2: 0
SUM OF ALL RESPONSES TO PHQ QUESTIONS 1-9: 0

## 2022-10-17 NOTE — PROGRESS NOTES
Subjective:      Bar Kelley is a 50 y.o. female Bar Kelley, was evaluated through a synchronous (real-time) audio-video encounter. The patient (or guardian if applicable) is aware that this is a billable service, which includes applicable co-pays. This Virtual Visit was conducted with patient's (and/or legal guardian's) consent. The visit was conducted pursuant to the emergency declaration under the [facility], [facility] authority and the [employer] Act. Patient identification was verified, and a caregiver was present when appropriate. The patient was located at Home: [address] [id number]. Provider was located at [facility] (Appt Dept): [address] [facility] [address] with [facility],  [facility] [address] I have reviewed with my delegate, that my patient's controlled substance history was checked on the [address] [place] prescription monitoring program (SCRIPTS) so they may obtain prescription(s) for a controlled substance. Presenting for follow up of ADD. Doing well, no side effects.   PHQ-9 Total Score: 0 (10/17/2022  4:42 PM)      Allergies   Allergen Reactions    Peanut-Containing Drug Products Anaphylaxis    Codeine Other (See Comments)     Patient Active Problem List   Diagnosis    Pure hypercholesterolemia    GERD (gastroesophageal reflux disease)    Essential hypertension    DANTE (generalized anxiety disorder)    Bipolar disease, chronic (HCC)    ADD (attention deficit disorder)    Vertigo    History of colonoscopy    Left ureteral stone    Hormone replacement therapy       Objective:      Respirations approximately 16-20 breaths per minute    Constitutional: [x] Appears well-developed and well-nourished [x] No apparent distress      [] Abnormal -     Mental status: [x] Alert and awake  [x] Oriented to person/place/time [x] Able to follow commands    [] Abnormal -     Eyes:   EOM [x]  Normal    [] Abnormal -   Sclera  [x]  Normal    [] Abnormal -          Discharge [x]  None visible   [] Abnormal -        Musculoskeletal:   [x] Normal gait with no signs of ataxia         [x] Normal range of motion of neck             Psychiatric:       [x] Normal Affect [] Abnormal -        [x] No Hallucinations  Most of today's visit spent counseling with review of symptoms, disposition, prognosis, and risk/benefit of treatment plan options in addition to limited behavioral counseling and recommendations, and the counseling has included my impressions of any previous diagnostic results, but also the importance of compliance and follow up, and we have talked about risk factor reduction where appropriate, and our patient education is chiefly verbal, and my or may not involve written patient education. Assessment:  1. Attention deficit disorder (ADD) without hyperactivity         Plan:   Other medical questions have been addressed/discussed as needed. Medications adjusted as needed. 1. Attention deficit disorder (ADD) without hyperactivity  -     Lisdexamfetamine Dimesylate 60 MG CAPS; Take 60 mg by mouth daily for 30 days. , Disp-30 capsule, R-0Normal  -     Lisdexamfetamine Dimesylate 60 MG CAPS; Take 60 mg by mouth daily for 30 days. , Disp-30 capsule, R-0Normal  -     Lisdexamfetamine Dimesylate 60 MG CAPS; Take 60 mg by mouth daily for 30 days. , Disp-30 capsule, R-0Normal      Followup:  According to instructions given today  Return for 3 mo for ADD check.

## 2023-01-16 ENCOUNTER — OFFICE VISIT (OUTPATIENT)
Dept: FAMILY MEDICINE CLINIC | Facility: CLINIC | Age: 48
End: 2023-01-16
Payer: COMMERCIAL

## 2023-01-16 VITALS
DIASTOLIC BLOOD PRESSURE: 78 MMHG | WEIGHT: 165 LBS | BODY MASS INDEX: 30.18 KG/M2 | SYSTOLIC BLOOD PRESSURE: 134 MMHG | HEART RATE: 112 BPM | OXYGEN SATURATION: 96 %

## 2023-01-16 DIAGNOSIS — M25.551 RIGHT HIP PAIN: ICD-10-CM

## 2023-01-16 DIAGNOSIS — F98.8 ATTENTION DEFICIT DISORDER (ADD) WITHOUT HYPERACTIVITY: ICD-10-CM

## 2023-01-16 DIAGNOSIS — S76.011A HIP STRAIN, RIGHT, INITIAL ENCOUNTER: ICD-10-CM

## 2023-01-16 DIAGNOSIS — I10 ESSENTIAL HYPERTENSION: Primary | ICD-10-CM

## 2023-01-16 DIAGNOSIS — F31.9 BIPOLAR DISEASE, CHRONIC (HCC): ICD-10-CM

## 2023-01-16 PROBLEM — Z79.890 HORMONE REPLACEMENT THERAPY: Status: ACTIVE | Noted: 2019-02-20

## 2023-01-16 PROCEDURE — 3078F DIAST BP <80 MM HG: CPT | Performed by: FAMILY MEDICINE

## 2023-01-16 PROCEDURE — 99214 OFFICE O/P EST MOD 30 MIN: CPT | Performed by: FAMILY MEDICINE

## 2023-01-16 PROCEDURE — 3075F SYST BP GE 130 - 139MM HG: CPT | Performed by: FAMILY MEDICINE

## 2023-01-16 RX ORDER — LISINOPRIL 10 MG/1
10 TABLET ORAL DAILY
Qty: 30 TABLET | Refills: 5 | Status: SHIPPED | OUTPATIENT
Start: 2023-01-16

## 2023-01-16 RX ORDER — LISINOPRIL 10 MG/1
10 TABLET ORAL DAILY
Qty: 30 TABLET | Refills: 5 | Status: SHIPPED | OUTPATIENT
Start: 2023-01-16 | End: 2023-01-16 | Stop reason: SDUPTHER

## 2023-01-16 NOTE — PROGRESS NOTES
Subjective:  Paris Cowan is a 50 y.o. female presents today for their semi-annual htn and follow-up of ADD. Wanting to reduce dosage for Vyvanse from 60 to 50 mg because at times it feels as if it is raising her blood pressure and it is too much. Also has been having about 6 weeks of right hip pain. Worse in the morning when she wakes up. Causing shooting pain sometimes from the flank/iliac crest area into the hip itself. There is no sciatic type symptoms. There is no weakness. She is trying to stretch. Is just a pain that seems to be present in the evenings when she is trying to sleep and then gets better as the day goes on. Is quite frustrating. Also here for ADD follow-up visit. They are having no side effects and are doing well. Also has noted hot flashes. Very annoying. Coming on suddenly. Wondering if she is not entering menopause. Had a complete hysterectomy except for a small portion of 1 ovary that remained. Had been on HRT briefly in the past but has not been on for years. Systems review of cardiovascular and pulmonary systems reveal no complaints or pertinent positives. Patient denies any pounding heart beats or rapid heart beat intervals, flushing, panic like attacks, headaches, dizzyness     If you don't have a home bp machine, you should purchase one at home and begin to check your pressure at home on a regular basis. Your blood pressure goal is listed under the \"plan section\" below    No data recorded      No data recorded    Allergies   Allergen Reactions    Peanut-Containing Drug Products Anaphylaxis    Codeine Other (See Comments)      reports that she quit smoking about 9 years ago. Her smoking use included cigarettes.  She has never used smokeless tobacco.  Current Outpatient Medications   Medication Sig Dispense Refill    lisinopril (PRINIVIL;ZESTRIL) 10 MG tablet Take 1 tablet by mouth daily 30 tablet 5    [START ON 3/17/2023] lisdexamfetamine (VYVANSE) 50 MG capsule Take 1 capsule by mouth every morning for 30 days. Max Daily Amount: 50 mg 30 capsule 0    [START ON 2/15/2023] lisdexamfetamine (VYVANSE) 50 MG capsule Take 1 capsule by mouth every morning for 30 days. Max Daily Amount: 50 mg 30 capsule 0    lisdexamfetamine (VYVANSE) 50 MG capsule Take 1 capsule by mouth every morning for 30 days. Max Daily Amount: 50 mg 30 capsule 0    escitalopram (LEXAPRO) 5 MG tablet Take 1 tablet by mouth in the morning. 90 tablet 1    cetirizine (ZYRTEC) 10 MG tablet Take by mouth      vitamin D (CHOLECALCIFEROL) 25 MCG (1000 UT) TABS tablet Take by mouth daily      EPINEPHrine (EPIPEN) 0.3 MG/0.3ML SOAJ injection Inject 0.3 mg into the muscle once as needed      albuterol-ipratropium (COMBIVENT RESPIMAT)  MCG/ACT AERS inhaler Inhale 1 puff into the lungs      valACYclovir (VALTREX) 500 MG tablet Take 500 mg by mouth 2 times daily as needed      Lisdexamfetamine Dimesylate 60 MG CAPS Take 60 mg by mouth daily for 30 days. 30 capsule 0    Lisdexamfetamine Dimesylate 60 MG CAPS Take 60 mg by mouth daily for 30 days. 30 capsule 0    Lisdexamfetamine Dimesylate 60 MG CAPS Take 60 mg by mouth daily for 30 days. 30 capsule 0     No current facility-administered medications for this visit. Lab Results   Component Value Date/Time     07/26/2022 04:38 PM    K 3.7 07/26/2022 04:38 PM     07/26/2022 04:38 PM    CO2 27 07/26/2022 04:38 PM    BUN 17 07/26/2022 04:38 PM    CREATININE 0.60 07/26/2022 04:38 PM    GLUCOSE 119 07/26/2022 04:38 PM    CALCIUM 8.7 07/26/2022 04:38 PM      Objective:  Blood pressure 134/78, pulse (!) 112, weight 165 lb (74.8 kg), SpO2 96 %. Body mass index is 30.18 kg/m².   BP Readings from Last 3 Encounters:   01/16/23 134/78   07/26/22 130/78   01/31/22 112/60     General- Pleasant and no distress  Psych- alert and oriented to person, place and time  Mood and affect are appropriate to situation  Musculoskeletal - Gait and station examination reveals mid-position with no abnormalities. Neurological- grossly intact. rrr s mrg.   bcta  extremeties are without edema, and dp, and pt pulses are intact  No carotid bruits   Fundoscopic exam is: benign without retinopathology     Assessment:  1. Essential hypertension    2. Attention deficit disorder (ADD) without hyperactivity    3. Right hip pain    4. Hip strain, right, initial encounter    5. Bipolar disease, chronic ([address]        Plan:   Prescription drug management occurs today as follows:  Because current regimen for blood pressure is now working and tolerated, will continue medications as listed    Dirk Perez has been given the following recommendations today due to her elevated BP reading: lab tests ordered. Personal instruction is given. For your information, consider the following:  Frank Oshea is an excellent site that will give you a list of approved blood pressure devices  [employer] is an excellent site that will teach you how to take accurate bp measurements at home. Significant weight loss can result in a drop of 5-10mm blood pressure. A DASH diet (see bookstore or go to www.[employer] and print DASH diet) will lower 8-14mm blood pressure. Salt restriction will lower your bp 2-8mm. Lowering alcohol consumption to moderate use will lower your pressure 2-4mm. Continue efforts to maintain an exercise regimen. Normal blood pressure target is now 120/80. Stage 1 or \"pre-hypertension\" or \"high normal hypertension\" is 130-139/80-89. We sometimes begin treatment with medications. Stage 2 is >140/90 which is when we always begin treatment with medications. For high risk patients such as those with heart disease, a history of stents, angioplasty, heart attacks, strokes, diabetes and chronic kidney disease,your blood pressure goal is 130/80. For lower risk patients without the high risk categories, your goal for blood pressure is 139/89.  Unless you are older than 72years old we may try for a systolic bp of 451 or we will accept higher pressures if the lower pressures are not tolerated. PT. Xray. Reduction of vyvanse dose from 60 to 50  If hot flash symptoms continue will need to see [place] to discuss HRT explained that we normally are shy away from HRT but its not contraindicated      1. Essential hypertension  -     Basic Metabolic Panel; Future  -     lisinopril (PRINIVIL;ZESTRIL) 10 MG tablet; Take 1 tablet by mouth daily, Disp-30 tablet, R-5Normal  2. Attention deficit disorder (ADD) without hyperactivity  -     lisdexamfetamine (VYVANSE) 50 MG capsule; Take 1 capsule by mouth every morning for 30 days. Max Daily Amount: 50 mg, Disp-30 capsule, R-0Normal  -     lisdexamfetamine (VYVANSE) 50 MG capsule; Take 1 capsule by mouth every morning for 30 days. Max Daily Amount: 50 mg, Disp-30 capsule, R-0Normal  -     lisdexamfetamine (VYVANSE) 50 MG capsule; Take 1 capsule by mouth every morning for 30 days. Max Daily Amount: 50 mg, Disp-30 capsule, R-0Normal  3. Right hip pain  -     XR HIP 2-3 VW W PELVIS RIGHT; Future  -     Amb External Referral To Physical Therapy  4. Hip strain, right, initial encounter  -     XR HIP 2-3 VW W PELVIS RIGHT; Future  -     Amb External Referral To Physical Therapy  5. Bipolar disease, chronic ([facility] [address]  Assessment & Plan:   Asymptomatic, continue current medications and continue current treatment plan      Followup:  Return for 3 mo for ADD check virtua. 6 months htn/ADD.

## 2023-01-17 ENCOUNTER — HOSPITAL ENCOUNTER (OUTPATIENT)
Dept: GENERAL RADIOLOGY | Age: 48
Discharge: HOME | End: 2023-01-20
Payer: COMMERCIAL

## 2023-01-17 DIAGNOSIS — S76.011A HIP STRAIN, RIGHT, INITIAL ENCOUNTER: ICD-10-CM

## 2023-01-17 DIAGNOSIS — M25.551 RIGHT HIP PAIN: ICD-10-CM

## 2023-01-17 LAB
ANION GAP SERPL CALC-SCNC: 4 MMOL/L (ref 2–11)
BUN SERPL-MCNC: 11 MG/DL (ref 6–23)
CALCIUM SERPL-MCNC: 9 MG/DL (ref 8.3–10.4)
CHLORIDE SERPL-SCNC: 106 MMOL/L (ref 101–110)
CO2 SERPL-SCNC: 30 MMOL/L (ref 21–32)
CREAT SERPL-MCNC: 0.7 MG/DL (ref 0.6–1)
GLUCOSE SERPL-MCNC: 123 MG/DL (ref 65–100)
POTASSIUM SERPL-SCNC: 3.9 MMOL/L (ref 3.5–5.1)
SODIUM SERPL-SCNC: 140 MMOL/L (ref 133–143)

## 2023-01-17 PROCEDURE — 73502 X-RAY EXAM HIP UNI 2-3 VIEWS: CPT

## 2023-02-18 DIAGNOSIS — F41.1 GAD (GENERALIZED ANXIETY DISORDER): ICD-10-CM

## 2023-02-20 RX ORDER — ESCITALOPRAM OXALATE 5 MG/1
5 TABLET ORAL DAILY
Qty: 90 TABLET | Refills: 0 | OUTPATIENT
Start: 2023-02-20

## 2023-02-20 RX ORDER — ESCITALOPRAM OXALATE 5 MG/1
5 TABLET ORAL DAILY
Qty: 90 TABLET | Refills: 1 | OUTPATIENT
Start: 2023-02-20

## 2023-02-21 ENCOUNTER — TELEPHONE (OUTPATIENT)
Dept: FAMILY MEDICINE CLINIC | Facility: CLINIC | Age: 48
End: 2023-02-21

## 2023-02-21 DIAGNOSIS — F41.1 GAD (GENERALIZED ANXIETY DISORDER): ICD-10-CM

## 2023-02-21 RX ORDER — ESCITALOPRAM OXALATE 5 MG/1
5 TABLET ORAL DAILY
Qty: 90 TABLET | Refills: 1 | Status: SHIPPED | OUTPATIENT
Start: 2023-02-21

## 2023-02-21 NOTE — TELEPHONE ENCOUNTER
Pt requests a refill of Lexapro 5 mg.     [address] in [facility] on [address] [facility]

## 2023-04-17 ENCOUNTER — TELEMEDICINE (OUTPATIENT)
Dept: FAMILY MEDICINE CLINIC | Facility: CLINIC | Age: 48
End: 2023-04-17
Payer: COMMERCIAL

## 2023-04-17 DIAGNOSIS — F41.1 GAD (GENERALIZED ANXIETY DISORDER): ICD-10-CM

## 2023-04-17 DIAGNOSIS — I10 ESSENTIAL HYPERTENSION: ICD-10-CM

## 2023-04-17 DIAGNOSIS — F98.8 ATTENTION DEFICIT DISORDER (ADD) WITHOUT HYPERACTIVITY: ICD-10-CM

## 2023-04-17 PROCEDURE — 99213 OFFICE O/P EST LOW 20 MIN: CPT | Performed by: FAMILY MEDICINE

## 2023-04-17 RX ORDER — LISINOPRIL 10 MG/1
10 TABLET ORAL DAILY
Qty: 30 TABLET | Refills: 2 | Status: SHIPPED | OUTPATIENT
Start: 2023-04-17

## 2023-04-17 RX ORDER — ESCITALOPRAM OXALATE 5 MG/1
5 TABLET ORAL DAILY
Qty: 30 TABLET | Refills: 2 | Status: SHIPPED | OUTPATIENT
Start: 2023-04-17

## 2023-04-17 SDOH — ECONOMIC STABILITY: FOOD INSECURITY: WITHIN THE PAST 12 MONTHS, THE FOOD YOU BOUGHT JUST DIDN'T LAST AND YOU DIDN'T HAVE MONEY TO GET MORE.: NEVER TRUE

## 2023-04-17 SDOH — ECONOMIC STABILITY: FOOD INSECURITY: WITHIN THE PAST 12 MONTHS, YOU WORRIED THAT YOUR FOOD WOULD RUN OUT BEFORE YOU GOT MONEY TO BUY MORE.: NEVER TRUE

## 2023-04-17 SDOH — ECONOMIC STABILITY: HOUSING INSECURITY
IN THE LAST 12 MONTHS, WAS THERE A TIME WHEN YOU DID NOT HAVE A STEADY PLACE TO SLEEP OR SLEPT IN A SHELTER (INCLUDING NOW)?: NO

## 2023-04-17 SDOH — ECONOMIC STABILITY: INCOME INSECURITY: HOW HARD IS IT FOR YOU TO PAY FOR THE VERY BASICS LIKE FOOD, HOUSING, MEDICAL CARE, AND HEATING?: NOT HARD AT ALL

## 2023-04-17 ASSESSMENT — PATIENT HEALTH QUESTIONNAIRE - PHQ9
7. TROUBLE CONCENTRATING ON THINGS, SUCH AS READING THE NEWSPAPER OR WATCHING TELEVISION: 0
8. MOVING OR SPEAKING SO SLOWLY THAT OTHER PEOPLE COULD HAVE NOTICED. OR THE OPPOSITE, BEING SO FIGETY OR RESTLESS THAT YOU HAVE BEEN MOVING AROUND A LOT MORE THAN USUAL: 0
5. POOR APPETITE OR OVEREATING: 0
SUM OF ALL RESPONSES TO PHQ9 QUESTIONS 1 & 2: 0
9. THOUGHTS THAT YOU WOULD BE BETTER OFF DEAD, OR OF HURTING YOURSELF: 0
4. FEELING TIRED OR HAVING LITTLE ENERGY: 0
SUM OF ALL RESPONSES TO PHQ QUESTIONS 1-9: 0
6. FEELING BAD ABOUT YOURSELF - OR THAT YOU ARE A FAILURE OR HAVE LET YOURSELF OR YOUR FAMILY DOWN: 0
SUM OF ALL RESPONSES TO PHQ QUESTIONS 1-9: 0
1. LITTLE INTEREST OR PLEASURE IN DOING THINGS: 0
2. FEELING DOWN, DEPRESSED OR HOPELESS: 0
SUM OF ALL RESPONSES TO PHQ QUESTIONS 1-9: 0
10. IF YOU CHECKED OFF ANY PROBLEMS, HOW DIFFICULT HAVE THESE PROBLEMS MADE IT FOR YOU TO DO YOUR WORK, TAKE CARE OF THINGS AT HOME, OR GET ALONG WITH OTHER PEOPLE: 0
3. TROUBLE FALLING OR STAYING ASLEEP: 0
SUM OF ALL RESPONSES TO PHQ QUESTIONS 1-9: 0

## 2023-04-17 NOTE — PROGRESS NOTES
[x]  Normal    [] Abnormal -   Sclera  [x]  Normal    [] Abnormal -          Discharge [x]  None visible   [] Abnormal -        Musculoskeletal:   [x] Normal gait with no signs of ataxia         [x] Normal range of motion of neck             Psychiatric:       [x] Normal Affect [] Abnormal -        [x] No Hallucinations  Most of today's visit spent counseling with review of symptoms, disposition, prognosis, and risk/benefit of treatment plan options in addition to limited behavioral counseling and recommendations, and the counseling has included my impressions of any previous diagnostic results, but also the importance of compliance and follow up, and we have talked about risk factor reduction where appropriate, and our patient education is chiefly verbal, and my or may not involve written patient education. Face to Face time was: 15 minutes      Assessment:  1. Attention deficit disorder (ADD) without hyperactivity    2. Essential hypertension    3. DANTE (generalized anxiety disorder)         Plan:   Other medical questions have been addressed/discussed as needed. Medications adjusted as needed. 1. Attention deficit disorder (ADD) without hyperactivity  -     lisdexamfetamine (VYVANSE) 50 MG capsule; Take 1 capsule by mouth every morning for 30 days. Max Daily Amount: 50 mg, Disp-30 capsule, R-0Normal  -     lisdexamfetamine (VYVANSE) 50 MG capsule; Take 1 capsule by mouth every morning for 30 days. Max Daily Amount: 50 mg, Disp-30 capsule, R-0Normal  -     lisdexamfetamine (VYVANSE) 50 MG capsule; Take 1 capsule by mouth every morning for 30 days. Max Daily Amount: 50 mg, Disp-30 capsule, R-0Normal  2. Essential hypertension  -     lisinopril (PRINIVIL;ZESTRIL) 10 MG tablet; Take 1 tablet by mouth daily, Disp-30 tablet, R-2Normal  3. DANTE (generalized anxiety disorder)  -     escitalopram (LEXAPRO) 5 MG tablet;  Take 1 tablet by mouth daily, Disp-30 tablet, R-2Normal      Followup:  According to instructions given

## 2023-07-14 ASSESSMENT — PATIENT HEALTH QUESTIONNAIRE - PHQ9
3. TROUBLE FALLING OR STAYING ASLEEP: NOT AT ALL
SUM OF ALL RESPONSES TO PHQ QUESTIONS 1-9: 0
6. FEELING BAD ABOUT YOURSELF - OR THAT YOU ARE A FAILURE OR HAVE LET YOURSELF OR YOUR FAMILY DOWN: 0
10. IF YOU CHECKED OFF ANY PROBLEMS, HOW DIFFICULT HAVE THESE PROBLEMS MADE IT FOR YOU TO DO YOUR WORK, TAKE CARE OF THINGS AT HOME, OR GET ALONG WITH OTHER PEOPLE: NOT DIFFICULT AT ALL
1. LITTLE INTEREST OR PLEASURE IN DOING THINGS: NOT AT ALL
6. FEELING BAD ABOUT YOURSELF - OR THAT YOU ARE A FAILURE OR HAVE LET YOURSELF OR YOUR FAMILY DOWN: NOT AT ALL
SUM OF ALL RESPONSES TO PHQ QUESTIONS 1-9: 0
4. FEELING TIRED OR HAVING LITTLE ENERGY: 0
5. POOR APPETITE OR OVEREATING: 0
2. FEELING DOWN, DEPRESSED OR HOPELESS: NOT AT ALL
5. POOR APPETITE OR OVEREATING: NOT AT ALL
SUM OF ALL RESPONSES TO PHQ QUESTIONS 1-9: 0
1. LITTLE INTEREST OR PLEASURE IN DOING THINGS: 0
SUM OF ALL RESPONSES TO PHQ9 QUESTIONS 1 & 2: 0
3. TROUBLE FALLING OR STAYING ASLEEP: 0
2. FEELING DOWN, DEPRESSED OR HOPELESS: 0
SUM OF ALL RESPONSES TO PHQ QUESTIONS 1-9: 0
10. IF YOU CHECKED OFF ANY PROBLEMS, HOW DIFFICULT HAVE THESE PROBLEMS MADE IT FOR YOU TO DO YOUR WORK, TAKE CARE OF THINGS AT HOME, OR GET ALONG WITH OTHER PEOPLE: 0
7. TROUBLE CONCENTRATING ON THINGS, SUCH AS READING THE NEWSPAPER OR WATCHING TELEVISION: NOT AT ALL
SUM OF ALL RESPONSES TO PHQ QUESTIONS 1-9: 0
8. MOVING OR SPEAKING SO SLOWLY THAT OTHER PEOPLE COULD HAVE NOTICED. OR THE OPPOSITE, BEING SO FIGETY OR RESTLESS THAT YOU HAVE BEEN MOVING AROUND A LOT MORE THAN USUAL: 0
9. THOUGHTS THAT YOU WOULD BE BETTER OFF DEAD, OR OF HURTING YOURSELF: 0
7. TROUBLE CONCENTRATING ON THINGS, SUCH AS READING THE NEWSPAPER OR WATCHING TELEVISION: 0
9. THOUGHTS THAT YOU WOULD BE BETTER OFF DEAD, OR OF HURTING YOURSELF: NOT AT ALL
4. FEELING TIRED OR HAVING LITTLE ENERGY: NOT AT ALL
8. MOVING OR SPEAKING SO SLOWLY THAT OTHER PEOPLE COULD HAVE NOTICED. OR THE OPPOSITE - BEING SO FIDGETY OR RESTLESS THAT YOU HAVE BEEN MOVING AROUND A LOT MORE THAN USUAL: NOT AT ALL

## 2023-07-17 ENCOUNTER — OFFICE VISIT (OUTPATIENT)
Dept: FAMILY MEDICINE CLINIC | Facility: CLINIC | Age: 48
End: 2023-07-17
Payer: COMMERCIAL

## 2023-07-17 VITALS
HEART RATE: 86 BPM | SYSTOLIC BLOOD PRESSURE: 118 MMHG | HEIGHT: 62 IN | DIASTOLIC BLOOD PRESSURE: 80 MMHG | BODY MASS INDEX: 30.77 KG/M2 | OXYGEN SATURATION: 97 % | WEIGHT: 167.2 LBS

## 2023-07-17 DIAGNOSIS — I10 ESSENTIAL HYPERTENSION: Primary | ICD-10-CM

## 2023-07-17 DIAGNOSIS — F41.1 GAD (GENERALIZED ANXIETY DISORDER): ICD-10-CM

## 2023-07-17 DIAGNOSIS — F98.8 ATTENTION DEFICIT DISORDER (ADD) WITHOUT HYPERACTIVITY: ICD-10-CM

## 2023-07-17 DIAGNOSIS — E78.00 PURE HYPERCHOLESTEROLEMIA: ICD-10-CM

## 2023-07-17 LAB
ALBUMIN SERPL-MCNC: 4.3 G/DL (ref 3.5–5)
ALBUMIN/GLOB SERPL: 1.6 (ref 0.4–1.6)
ALP SERPL-CCNC: 77 U/L (ref 50–136)
ALT SERPL-CCNC: 27 U/L (ref 12–65)
ANION GAP SERPL CALC-SCNC: 6 MMOL/L (ref 2–11)
AST SERPL-CCNC: 9 U/L (ref 15–37)
BILIRUB DIRECT SERPL-MCNC: <0.1 MG/DL
BILIRUB SERPL-MCNC: 0.3 MG/DL (ref 0.2–1.1)
BUN SERPL-MCNC: 15 MG/DL (ref 6–23)
CALCIUM SERPL-MCNC: 9.3 MG/DL (ref 8.3–10.4)
CHLORIDE SERPL-SCNC: 109 MMOL/L (ref 101–110)
CO2 SERPL-SCNC: 27 MMOL/L (ref 21–32)
CREAT SERPL-MCNC: 0.7 MG/DL (ref 0.6–1)
GLOBULIN SER CALC-MCNC: 2.7 G/DL (ref 2.8–4.5)
GLUCOSE SERPL-MCNC: 111 MG/DL (ref 65–100)
POTASSIUM SERPL-SCNC: 4.1 MMOL/L (ref 3.5–5.1)
PROT SERPL-MCNC: 7 G/DL (ref 6.3–8.2)
SODIUM SERPL-SCNC: 142 MMOL/L (ref 133–143)

## 2023-07-17 PROCEDURE — 3079F DIAST BP 80-89 MM HG: CPT | Performed by: FAMILY MEDICINE

## 2023-07-17 PROCEDURE — 3074F SYST BP LT 130 MM HG: CPT | Performed by: FAMILY MEDICINE

## 2023-07-17 PROCEDURE — 99214 OFFICE O/P EST MOD 30 MIN: CPT | Performed by: FAMILY MEDICINE

## 2023-07-17 RX ORDER — ESCITALOPRAM OXALATE 5 MG/1
5 TABLET ORAL DAILY
Qty: 30 TABLET | Refills: 5 | Status: SHIPPED | OUTPATIENT
Start: 2023-07-17

## 2023-07-17 RX ORDER — LISINOPRIL 10 MG/1
10 TABLET ORAL DAILY
Qty: 30 TABLET | Refills: 5 | Status: SHIPPED | OUTPATIENT
Start: 2023-07-17

## 2023-07-17 RX ORDER — ESTRADIOL 0.05 MG/D
1 PATCH, EXTENDED RELEASE TRANSDERMAL
Qty: 8 PATCH | Refills: 11 | COMMUNITY
Start: 2023-05-01 | End: 2024-04-29

## 2023-08-15 ENCOUNTER — HOSPITAL ENCOUNTER (OUTPATIENT)
Dept: MAMMOGRAPHY | Age: 48
Discharge: HOME | End: 2023-08-18
Payer: COMMERCIAL

## 2023-08-15 VITALS — WEIGHT: 159 LBS | HEIGHT: 62 IN | BODY MASS INDEX: 29.26 KG/M2

## 2023-08-15 DIAGNOSIS — Z12.31 VISIT FOR SCREENING MAMMOGRAM: ICD-10-CM

## 2023-08-15 PROCEDURE — 77063 BREAST TOMOSYNTHESIS BI: CPT

## 2023-08-22 ENCOUNTER — HOSPITAL ENCOUNTER (OUTPATIENT)
Dept: MAMMOGRAPHY | Age: 48
Discharge: HOME | End: 2023-08-25
Payer: COMMERCIAL

## 2023-08-22 DIAGNOSIS — R92.8 ABNORMAL SCREENING MAMMOGRAM: ICD-10-CM

## 2023-08-22 PROCEDURE — 76642 ULTRASOUND BREAST LIMITED: CPT

## 2023-08-22 PROCEDURE — G0279 TOMOSYNTHESIS, MAMMO: HCPCS

## 2023-10-17 ENCOUNTER — TELEMEDICINE (OUTPATIENT)
Dept: FAMILY MEDICINE CLINIC | Facility: CLINIC | Age: 48
End: 2023-10-17
Payer: COMMERCIAL

## 2023-10-17 DIAGNOSIS — F98.8 ATTENTION DEFICIT DISORDER (ADD) WITHOUT HYPERACTIVITY: Primary | ICD-10-CM

## 2023-10-17 PROCEDURE — 99213 OFFICE O/P EST LOW 20 MIN: CPT | Performed by: FAMILY MEDICINE

## 2023-10-17 RX ORDER — LISDEXAMFETAMINE DIMESYLATE CAPSULES 50 MG/1
50 CAPSULE ORAL EVERY MORNING
Qty: 30 CAPSULE | Refills: 0 | Status: SHIPPED | OUTPATIENT
Start: 2023-12-18 | End: 2024-01-17

## 2023-10-17 RX ORDER — LISDEXAMFETAMINE DIMESYLATE CAPSULES 50 MG/1
50 CAPSULE ORAL EVERY MORNING
Qty: 30 CAPSULE | Refills: 0 | Status: SHIPPED | OUTPATIENT
Start: 2023-10-17 | End: 2023-11-16

## 2023-10-17 RX ORDER — LISDEXAMFETAMINE DIMESYLATE CAPSULES 50 MG/1
50 CAPSULE ORAL EVERY MORNING
Qty: 30 CAPSULE | Refills: 0 | Status: SHIPPED | OUTPATIENT
Start: 2023-11-17 | End: 2023-12-17

## 2023-10-17 NOTE — PROGRESS NOTES
Subjective:      Jacques Li is a 46 y.o. female Jacques Li, was evaluated through a synchronous (real-time) audio-video encounter. The patient (or guardian if applicable) is aware that this is a billable service, which includes applicable co-pays. This Virtual Visit was conducted with patient's (and/or legal guardian's) consent. Patient identification was verified, and a caregiver was present when appropriate. The patient was located at Home: [address] [place] [phone]  Provider was located at Facility (Appt Dept): [facility],  [address]        Presenting for follow up of ADD. Doing well, no side effects.     Allergies   Allergen Reactions    Peanut-Containing Drug Products Anaphylaxis    Codeine Other (See Comments)     Patient Active Problem List   Diagnosis    Pure hypercholesterolemia    GERD (gastroesophageal reflux disease)    Essential hypertension    DANTE (generalized anxiety disorder)    Bipolar disease, chronic (HCC)    ADD (attention deficit disorder)    Vertigo    History of colonoscopy    Left ureteral stone    Hormone replacement therapy         Objective:      Respirations approximately 16-20 breaths per minute    Constitutional: [x] Appears well-developed and well-nourished [x] No apparent distress      [] Abnormal -     Mental status: [x] Alert and awake  [x] Oriented to person/place/time [x] Able to follow commands    [] Abnormal -     Eyes:   EOM    [x]  Normal    [] Abnormal -   Sclera  [x]  Normal    [] Abnormal -          Discharge [x]  None visible   [] Abnormal -        Musculoskeletal:   [x] Normal gait with no signs of ataxia         [x] Normal range of motion of neck             Psychiatric:       [x] Normal Affect [] Abnormal -        [x] No Hallucinations  Most of today's visit spent counseling with review of symptoms, disposition, prognosis, and risk/benefit of treatment plan options in addition to limited behavioral

## 2023-10-26 ENCOUNTER — OFFICE VISIT (OUTPATIENT)
Dept: FAMILY MEDICINE CLINIC | Facility: CLINIC | Age: 48
End: 2023-10-26

## 2023-10-26 VITALS
OXYGEN SATURATION: 97 % | HEIGHT: 62 IN | SYSTOLIC BLOOD PRESSURE: 122 MMHG | BODY MASS INDEX: 31.28 KG/M2 | WEIGHT: 170 LBS | RESPIRATION RATE: 16 BRPM | TEMPERATURE: 98 F | DIASTOLIC BLOOD PRESSURE: 81 MMHG | HEART RATE: 81 BPM

## 2023-10-26 DIAGNOSIS — M77.11 RIGHT LATERAL EPICONDYLITIS: Primary | ICD-10-CM

## 2023-10-26 DIAGNOSIS — Z23 INFLUENZA VACCINE NEEDED: ICD-10-CM

## 2023-10-26 ASSESSMENT — COLUMBIA-SUICIDE SEVERITY RATING SCALE - C-SSRS
4. HAVE YOU HAD THESE THOUGHTS AND HAD SOME INTENTION OF ACTING ON THEM?: NO
7. DID THIS OCCUR IN THE LAST THREE MONTHS: NO
3. HAVE YOU BEEN THINKING ABOUT HOW YOU MIGHT KILL YOURSELF?: NO
5. HAVE YOU STARTED TO WORK OUT OR WORKED OUT THE DETAILS OF HOW TO KILL YOURSELF? DO YOU INTEND TO CARRY OUT THIS PLAN?: NO

## 2023-10-26 ASSESSMENT — PATIENT HEALTH QUESTIONNAIRE - PHQ9
3. TROUBLE FALLING OR STAYING ASLEEP: 0
2. FEELING DOWN, DEPRESSED OR HOPELESS: 0
5. POOR APPETITE OR OVEREATING: 0
SUM OF ALL RESPONSES TO PHQ9 QUESTIONS 1 & 2: 0
SUM OF ALL RESPONSES TO PHQ QUESTIONS 1-9: 0
SUM OF ALL RESPONSES TO PHQ QUESTIONS 1-9: 0
4. FEELING TIRED OR HAVING LITTLE ENERGY: 0
SUM OF ALL RESPONSES TO PHQ QUESTIONS 1-9: 0
10. IF YOU CHECKED OFF ANY PROBLEMS, HOW DIFFICULT HAVE THESE PROBLEMS MADE IT FOR YOU TO DO YOUR WORK, TAKE CARE OF THINGS AT HOME, OR GET ALONG WITH OTHER PEOPLE: 0
9. THOUGHTS THAT YOU WOULD BE BETTER OFF DEAD, OR OF HURTING YOURSELF: 0
SUM OF ALL RESPONSES TO PHQ QUESTIONS 1-9: 0
1. LITTLE INTEREST OR PLEASURE IN DOING THINGS: 0
7. TROUBLE CONCENTRATING ON THINGS, SUCH AS READING THE NEWSPAPER OR WATCHING TELEVISION: 0
6. FEELING BAD ABOUT YOURSELF - OR THAT YOU ARE A FAILURE OR HAVE LET YOURSELF OR YOUR FAMILY DOWN: 0
8. MOVING OR SPEAKING SO SLOWLY THAT OTHER PEOPLE COULD HAVE NOTICED. OR THE OPPOSITE, BEING SO FIGETY OR RESTLESS THAT YOU HAVE BEEN MOVING AROUND A LOT MORE THAN USUAL: 0

## 2023-12-14 NOTE — PROGRESS NOTES
Subjective:  Sterling Garcia is a 50 y.o. female presents today for their semi-annual htn and follow-up of depression and ADD. Visit. They are having no side effects and are doing well. Systems review of cardiovascular and pulmonary systems reveal no complaints or pertinent positives. Patient denies any pounding heart beats or rapid heart beat intervals, flushing, panic like attacks, headaches, dizzyness or flushing. They have drug reistant hypertension, on 3 or more different medicaitons including one diuretic- No    How much are you exercising? Daily   Do you smoke? No  Are you taking your medicines as directed most every day? Yes  Do you follow a low sodium DASH diet or similar high blood pressure diet? Yes  Do you check your bp at home with an automated blood pressure device? Yes  If you don't have a home bp machine, you should purchase one at home and begin to check your pressure at home on a regular basis. Your blood pressure goal is listed under the \"plan section\" below  PHQ-9 Total Score: 0 (7/14/2023 10:05 AM)  Thoughts that you would be better off dead, or of hurting yourself in some way: 0 (7/14/2023 10:05 AM)      Allergies   Allergen Reactions    Peanut-Containing Drug Products Anaphylaxis    Codeine Other (See Comments)      reports that she quit smoking about 9 years ago. Her smoking use included cigarettes. She has never used smokeless tobacco.  Current Outpatient Medications   Medication Sig Dispense Refill    estradiol (VIVELLE) 0.05 MG/24HR Place 1 patch onto the skin Twice a Week 8 patch 11    lisinopril (PRINIVIL;ZESTRIL) 10 MG tablet Take 1 tablet by mouth daily 30 tablet 5    escitalopram (LEXAPRO) 5 MG tablet Take 1 tablet by mouth daily 30 tablet 5    lisdexamfetamine (VYVANSE) 50 MG capsule Take 1 capsule by mouth every morning for 30 days.  Max Daily Amount: 50 mg 30 capsule 0    [START ON 8/16/2023] lisdexamfetamine (VYVANSE) 50 MG capsule Take 1 capsule by mouth every morning
96

## 2024-01-09 DIAGNOSIS — F98.8 ATTENTION DEFICIT DISORDER (ADD) WITHOUT HYPERACTIVITY: ICD-10-CM

## 2024-01-09 RX ORDER — LISDEXAMFETAMINE DIMESYLATE CAPSULES 50 MG/1
50 CAPSULE ORAL EVERY MORNING
Qty: 30 CAPSULE | Refills: 0 | OUTPATIENT
Start: 2024-01-09 | End: 2024-02-08

## 2024-01-10 DIAGNOSIS — F98.8 ATTENTION DEFICIT DISORDER (ADD) WITHOUT HYPERACTIVITY: ICD-10-CM

## 2024-01-10 RX ORDER — LISDEXAMFETAMINE DIMESYLATE CAPSULES 50 MG/1
50 CAPSULE ORAL EVERY MORNING
Qty: 30 CAPSULE | Refills: 0 | OUTPATIENT
Start: 2024-01-10 | End: 2024-02-09

## 2024-01-10 NOTE — TELEPHONE ENCOUNTER
Patient has been out of Vyvanse for 2 days  and needs a refill.  Has appointment scheduled on 17th

## 2024-01-17 ENCOUNTER — OFFICE VISIT (OUTPATIENT)
Dept: FAMILY MEDICINE CLINIC | Facility: CLINIC | Age: 49
End: 2024-01-17
Payer: COMMERCIAL

## 2024-01-17 VITALS
WEIGHT: 171 LBS | RESPIRATION RATE: 16 BRPM | TEMPERATURE: 97.7 F | HEIGHT: 62 IN | DIASTOLIC BLOOD PRESSURE: 73 MMHG | HEART RATE: 101 BPM | SYSTOLIC BLOOD PRESSURE: 108 MMHG | OXYGEN SATURATION: 98 % | BODY MASS INDEX: 31.47 KG/M2

## 2024-01-17 DIAGNOSIS — I10 ESSENTIAL HYPERTENSION: Primary | ICD-10-CM

## 2024-01-17 DIAGNOSIS — F41.1 GAD (GENERALIZED ANXIETY DISORDER): ICD-10-CM

## 2024-01-17 DIAGNOSIS — M77.11 RIGHT LATERAL EPICONDYLITIS: ICD-10-CM

## 2024-01-17 DIAGNOSIS — F98.8 ATTENTION DEFICIT DISORDER (ADD) WITHOUT HYPERACTIVITY: ICD-10-CM

## 2024-01-17 DIAGNOSIS — F31.9 BIPOLAR DISEASE, CHRONIC (HCC): ICD-10-CM

## 2024-01-17 LAB
ANION GAP SERPL CALC-SCNC: 3 MMOL/L (ref 2–11)
BUN SERPL-MCNC: 15 MG/DL (ref 6–23)
CALCIUM SERPL-MCNC: 9.5 MG/DL (ref 8.3–10.4)
CHLORIDE SERPL-SCNC: 105 MMOL/L (ref 103–113)
CO2 SERPL-SCNC: 28 MMOL/L (ref 21–32)
CREAT SERPL-MCNC: 0.8 MG/DL (ref 0.6–1)
GLUCOSE SERPL-MCNC: 136 MG/DL (ref 65–100)
POTASSIUM SERPL-SCNC: 4.1 MMOL/L (ref 3.5–5.1)
SODIUM SERPL-SCNC: 136 MMOL/L (ref 136–146)

## 2024-01-17 PROCEDURE — 3078F DIAST BP <80 MM HG: CPT | Performed by: FAMILY MEDICINE

## 2024-01-17 PROCEDURE — 99214 OFFICE O/P EST MOD 30 MIN: CPT | Performed by: FAMILY MEDICINE

## 2024-01-17 PROCEDURE — 3074F SYST BP LT 130 MM HG: CPT | Performed by: FAMILY MEDICINE

## 2024-01-17 RX ORDER — ESCITALOPRAM OXALATE 5 MG/1
5 TABLET ORAL DAILY
Qty: 30 TABLET | Refills: 5 | Status: SHIPPED | OUTPATIENT
Start: 2024-01-17

## 2024-01-17 RX ORDER — LISINOPRIL 10 MG/1
10 TABLET ORAL DAILY
Qty: 30 TABLET | Refills: 5 | Status: SHIPPED | OUTPATIENT
Start: 2024-01-17

## 2024-01-17 RX ORDER — LISDEXAMFETAMINE DIMESYLATE CAPSULES 50 MG/1
50 CAPSULE ORAL DAILY
Qty: 30 CAPSULE | Refills: 0 | Status: SHIPPED | OUTPATIENT
Start: 2024-02-16 | End: 2024-03-17

## 2024-01-17 RX ORDER — LISDEXAMFETAMINE DIMESYLATE CAPSULES 50 MG/1
50 CAPSULE ORAL DAILY
Qty: 30 CAPSULE | Refills: 0 | Status: SHIPPED | OUTPATIENT
Start: 2024-03-17 | End: 2024-04-16

## 2024-01-17 RX ORDER — LISDEXAMFETAMINE DIMESYLATE CAPSULES 50 MG/1
50 CAPSULE ORAL DAILY
Qty: 30 CAPSULE | Refills: 0 | Status: SHIPPED | OUTPATIENT
Start: 2024-01-17 | End: 2024-02-16

## 2024-01-17 ASSESSMENT — PATIENT HEALTH QUESTIONNAIRE - PHQ9
SUM OF ALL RESPONSES TO PHQ QUESTIONS 1-9: 0
5. POOR APPETITE OR OVEREATING: 0
SUM OF ALL RESPONSES TO PHQ QUESTIONS 1-9: 0
6. FEELING BAD ABOUT YOURSELF - OR THAT YOU ARE A FAILURE OR HAVE LET YOURSELF OR YOUR FAMILY DOWN: 0
9. THOUGHTS THAT YOU WOULD BE BETTER OFF DEAD, OR OF HURTING YOURSELF: 0
SUM OF ALL RESPONSES TO PHQ QUESTIONS 1-9: 0
8. MOVING OR SPEAKING SO SLOWLY THAT OTHER PEOPLE COULD HAVE NOTICED. OR THE OPPOSITE, BEING SO FIGETY OR RESTLESS THAT YOU HAVE BEEN MOVING AROUND A LOT MORE THAN USUAL: 0
SUM OF ALL RESPONSES TO PHQ QUESTIONS 1-9: 0
2. FEELING DOWN, DEPRESSED OR HOPELESS: 0
4. FEELING TIRED OR HAVING LITTLE ENERGY: 0
SUM OF ALL RESPONSES TO PHQ9 QUESTIONS 1 & 2: 0
7. TROUBLE CONCENTRATING ON THINGS, SUCH AS READING THE NEWSPAPER OR WATCHING TELEVISION: 0
3. TROUBLE FALLING OR STAYING ASLEEP: 0
10. IF YOU CHECKED OFF ANY PROBLEMS, HOW DIFFICULT HAVE THESE PROBLEMS MADE IT FOR YOU TO DO YOUR WORK, TAKE CARE OF THINGS AT HOME, OR GET ALONG WITH OTHER PEOPLE: 0
1. LITTLE INTEREST OR PLEASURE IN DOING THINGS: 0

## 2024-01-17 NOTE — PROGRESS NOTES
appropriate to situation  Musculoskeletal - Gait and station examination reveals mid-position with no abnormalities.  Neurological- grossly intact.   rrr s mrg.   bcta  extremeties are without edema, and dp, and pt pulses are intact  No carotid bruits   Fundoscopic exam is: benign without retinopathology     Assessment:  1. Essential hypertension    2. DANTE (generalized anxiety disorder)    3. Bipolar disease, chronic (HCC)    4. Attention deficit disorder (ADD) without hyperactivity    5. Right lateral epicondylitis        Plan:   Prescription drug management occurs today as follows:  Because current regimen for blood pressure is now working and tolerated, will continue medications as listed    Reinaldo has been given the following recommendations today due to her elevated BP reading: lab tests ordered.    Personal instruction is given.  For your information, consider the following:  Validatebp.org is an excellent site that will give you a list of approved blood pressure devices  Targetbp.org is an excellent site that will teach you how to take accurate bp measurements at home.  Significant weight loss can result in a drop of 5-10mm blood pressure.  A DASH diet (see bookstore or go to www.[employer] and print DASH diet) will lower 8-14mm blood pressure.  Salt restriction will lower your bp 2-8mm.  Lowering alcohol consumption to moderate use will lower your pressure 2-4mm. Continue efforts to maintain an exercise regimen.    Normal blood pressure target is now 120/80.   Stage 1 hypertension is 130-139/80-89. We sometimes begin treatment with medications but prefer a trial of 3-6 months with diet, exercise, smoking cessation etc.  Stage 2 is >140/90 which is when we always begin treatment with medications and our goal is to get bp at or below 130/80      1. Essential hypertension  -     lisinopril (PRINIVIL;ZESTRIL) 10 MG tablet; Take 1 tablet by mouth daily, Disp-30 tablet, R-5Normal  -     Basic Metabolic Panel;

## 2024-01-24 ENCOUNTER — NURSE ONLY (OUTPATIENT)
Dept: FAMILY MEDICINE CLINIC | Facility: CLINIC | Age: 49
End: 2024-01-24

## 2024-01-24 DIAGNOSIS — R73.09 ELEVATED GLUCOSE: Primary | ICD-10-CM

## 2024-01-24 LAB
EST. AVERAGE GLUCOSE BLD GHB EST-MCNC: 117 MG/DL
HBA1C MFR BLD: 5.7 % (ref 4.8–5.6)

## 2024-01-29 ENCOUNTER — OFFICE VISIT (OUTPATIENT)
Dept: ORTHOPEDIC SURGERY | Age: 49
End: 2024-01-29
Payer: COMMERCIAL

## 2024-01-29 DIAGNOSIS — R20.0 NUMBNESS AND TINGLING OF RIGHT UPPER EXTREMITY: ICD-10-CM

## 2024-01-29 DIAGNOSIS — M25.521 RIGHT ELBOW PAIN: ICD-10-CM

## 2024-01-29 DIAGNOSIS — M77.11 RIGHT TENNIS ELBOW: Primary | ICD-10-CM

## 2024-01-29 DIAGNOSIS — R20.2 NUMBNESS AND TINGLING OF RIGHT UPPER EXTREMITY: ICD-10-CM

## 2024-01-29 PROCEDURE — L3908 WHO COCK-UP NONMOLDE PRE OTS: HCPCS | Performed by: NURSE PRACTITIONER

## 2024-01-29 PROCEDURE — 99214 OFFICE O/P EST MOD 30 MIN: CPT | Performed by: NURSE PRACTITIONER

## 2024-01-29 NOTE — PROGRESS NOTES
Patient was fit for a Wrist/Forearm lacer for patients right elbow pain. Patient is instructed the brace should fit nicely with in the palm and right below the knuckles on the dorsal side of the hand. The patient was aware the brace should fit snuggly around the wrist/forearm area. The strap placed through the thumb and first finger should fit comfortably to insure the brace does not slide or shift.     Patient read and signed documenting they understand and agree to [facility]'s current DME return policy.   
completed.  I will put her in a wrist brace that she will wear while sleeping, driving, texting/typing..     Patient voiced accordance and understanding of the information provided and the formulated plan. All questions were answered to the patient's satisfaction during the encounter.    4 This is a chronic illness with exacerbation, progression, or side effect of treatment  Treatment at this time: Brace, MRI/CT, and EMG/NCV    DELIA Booth - CNP  Orthopaedic Surgery  01/29/24  4:16 PM

## 2024-01-29 NOTE — PATIENT INSTRUCTIONS
Patient Education        Tennis Elbow: Care Instructions  Overview     Tennis elbow is soreness or pain on the outer part of the elbow. The pain occurs when the tendon is stretched and becomes irritated by repeated twisting of the hand, wrist, and forearm. A tendon is a tough tissue that connects muscle to bone. This injury is common in tennis players. But you also can get it from many activities that work the same muscles. Examples include gardening, painting, and using tools.  Tennis elbow usually heals with rest and treatment at home.  Follow-up care is a key part of your treatment and safety. Be sure to make and go to all appointments, and call your doctor if you are having problems. It's also a good idea to know your test results and keep a list of the medicines you take.  How can you care for yourself at home?    Rest your fingers, wrist, and forearm. Try to stop or reduce any activity that causes elbow pain. You may have to rest your arm for weeks to months. Follow your doctor's directions for how long to rest.     Put ice or a cold pack on your elbow for 10 to 20 minutes at a time. Try to do this every 1 to 2 hours for the next 3 days (when you are awake) or until the swelling goes down. Put a thin cloth between the ice and your skin. You can try heat, or alternating heat and ice, after the first 3 days.     If your doctor gave you a brace or splint, use it as directed. A \"counterforce\" brace is a strap around your forearm, just below your elbow. It may ease the pressure on the tendon and spread force throughout your arm.     Prop up your elbow on pillows to help reduce swelling.     Follow your doctor's or physical therapist's directions for exercise.     Return to your usual activities slowly.     Try to prevent the problem. Learn the best techniques for your sport. For example, make sure the  on your tennis racquet is not too big for your hand. Try not to hit a tennis ball late in your swing.     If

## 2024-02-12 ENCOUNTER — OFFICE VISIT (OUTPATIENT)
Dept: ORTHOPEDIC SURGERY | Age: 49
End: 2024-02-12
Payer: COMMERCIAL

## 2024-02-12 DIAGNOSIS — M77.11 RIGHT TENNIS ELBOW: ICD-10-CM

## 2024-02-12 DIAGNOSIS — F98.8 ATTENTION DEFICIT DISORDER (ADD) WITHOUT HYPERACTIVITY: ICD-10-CM

## 2024-02-12 DIAGNOSIS — R20.2 NUMBNESS AND TINGLING OF RIGHT UPPER EXTREMITY: Primary | ICD-10-CM

## 2024-02-12 DIAGNOSIS — R20.0 NUMBNESS AND TINGLING OF RIGHT UPPER EXTREMITY: Primary | ICD-10-CM

## 2024-02-12 PROCEDURE — 99214 OFFICE O/P EST MOD 30 MIN: CPT | Performed by: NURSE PRACTITIONER

## 2024-02-12 NOTE — PROGRESS NOTES
Orthopaedic Hand Surgery Note    Name: Roxie Najera  YOB: 1975  Gender: female  MRN: [id number]    Follow up: right Elbow Pain    HPI: Patient is a 48 y.o. female who return for evaluation of right lateral epicondylitis.  She is here to discuss her MRI results.  She is currently scheduled for EMGs on March 11, 2024.    Previous note:  Symptoms have been going on for over 9 months, pain is aggravated with lifting heavy objects, alleviated by rest. Other complaints include numbness and tingling.  She said this has been going on for several months.  She notes it is worse at night.  She has tried anti-inflammatories, but creams and pills.  She is use ice and heat.  She has tried resting.  Her PCP gave her a right lateral epicondylitis injection in October 2023.  She reports temporary relief for couple weeks.  She also notes she was on some oral steroids for respiratory infections which temporarily helped her pain.  She has done over 2 months of a home exercise program instructed by physical therapist.  She reports her pain has returned.  Does affect her daily life..     ROS/Meds/PSH/PMH/FH/SH: I personally reviewed the patients standard intake form.  Pertinents are discussed in the HPI    Physical Examination:    Musculoskeletal:   Examination on the rightupper extremity demonstrates decreased sensation to light touch in the median and ulnar distribution, cap refill < 5 seconds in all fingers.  There is severe tenderness on the lateral epicondyle, no tenderness on the medial epicondyle or the olecranon, pain on the lateral elbow is aggravated by resisted wrist and finger extension, there is no pain with palpation of the radial tunnel, negative Tinel along the radial nerve tract, no pain with resisted supination, no pain with resisted pronation.   positive carpal tunnel compression testing and Phalen testing, cap refill < 5 seconds in all fingers. Inspection reveals no thenar atrophy. Positive

## 2024-02-14 ENCOUNTER — PROCEDURE VISIT (OUTPATIENT)
Dept: NEUROLOGY | Age: 49
End: 2024-02-14

## 2024-02-14 VITALS — HEART RATE: 93 BPM | OXYGEN SATURATION: 97 % | BODY MASS INDEX: 31.28 KG/M2 | HEIGHT: 62 IN | WEIGHT: 170 LBS

## 2024-02-14 DIAGNOSIS — R20.0 NUMBNESS AND TINGLING OF RIGHT UPPER EXTREMITY: Primary | ICD-10-CM

## 2024-02-14 DIAGNOSIS — R20.2 NUMBNESS AND TINGLING OF RIGHT UPPER EXTREMITY: Primary | ICD-10-CM

## 2024-02-14 NOTE — PROGRESS NOTES
EMG/Nerve Conduction Study Procedure Note  [address]    [address]  [place]  [address]   [phone]      Hx:    Exam:     48 y.o.  M W female  with pain right arm forearm and epicondyle with paresthesia to hand. In a wrist brace.  No atrophy.  No Aden.   Tender lateral epicondyle and on extension fingers and hand.  No dyskinesia.  Full range of motion of the neck without any Spurling or Lhermitte sign.  No Duncan.  She notes she had elbow injection therapy and steroids = she felt better on the steroids whether injected or by oral.  Then the pain came back.  She mainly complains almost bitterly of pain at the elbow on extension of the hand and fingers.  Along the tendon and compatible with lateral epicondylitis.  No Tinel.  Referring: Sachi Moran NP   POCODY     Technologist: Gama Ware  Height 5 foot 2 inch       Summary           needle EMG of selected upper extremity muscles with CV.           Controlled environmental factors / EMG lab.  Temperature.   NCV : sensory segments:    Minimally mildly slowed right median to digit 2 SCV.  Normal right ulnar and right radial SCV.  NCV transcarpal sensory segments:   Abnormal = transcarpal median is slowed.  Normal transcarpal ulnar with a peak difference of latency at 0.69 ms (UL = 0.20 ms).  NCV Motor MCV segments:     Borderline abnormalities of the right median to APB MCV revealing only attenuation of the CMAP amplitudes but without any slowing or delay.  Right ulnar both to the ADM and the FDI within normal limits.  F-wave studies:           Normal right median and right ulnar F waves.   NEEDLE EMG:   Tested muscles:: Right FDI FCU APB ADM = the only muscle with abnormalities includes the right APB with 3+ 4+ 4+ IA fib PSW without any fasciculations or myotonia and there is some discrete recruitment pattern.  The FDI FCU ADM all are within normal limits.      INTERPRETATION:    ELECTROPHYSIOLOGIC FINDINGS REVEALING MEDIAN ENTRAPMENT AT THE WRIST WITHOUT

## 2024-02-16 ENCOUNTER — TELEPHONE (OUTPATIENT)
Dept: ORTHOPEDIC SURGERY | Age: 49
End: 2024-02-16

## 2024-02-16 NOTE — TELEPHONE ENCOUNTER
----- Message from DELIA Booth CNP sent at 2/16/2024  8:33 AM EST -----  Will you please call the patient to schedule her for surgery.  She does have carpal tunnel syndrome so we will do that surgery as well.    Irrigation and debridement right lateral epicondyle with possible tendon reattachment and ultrasound-guided right carpal tunnel release

## 2024-02-16 NOTE — TELEPHONE ENCOUNTER
I returned patient call. Emg/Ncv results were discussed per Ana Moran NP. Patients surgery will be scheduled on 3/28/24 with Dr. Cruz. Patient understands to call back with any other questions or concerns.

## 2024-02-19 RX ORDER — LISDEXAMFETAMINE DIMESYLATE CAPSULES 50 MG/1
50 CAPSULE ORAL DAILY
Qty: 30 CAPSULE | Refills: 0 | Status: SHIPPED | OUTPATIENT
Start: 2024-03-20 | End: 2024-04-19

## 2024-02-19 RX ORDER — LISDEXAMFETAMINE DIMESYLATE CAPSULES 50 MG/1
50 CAPSULE ORAL DAILY
Qty: 30 CAPSULE | Refills: 0 | Status: SHIPPED | OUTPATIENT
Start: 2024-02-19 | End: 2024-03-20

## 2024-02-22 DIAGNOSIS — R20.0 NUMBNESS AND TINGLING OF RIGHT UPPER EXTREMITY: ICD-10-CM

## 2024-02-22 DIAGNOSIS — R20.2 NUMBNESS AND TINGLING OF RIGHT UPPER EXTREMITY: ICD-10-CM

## 2024-02-22 DIAGNOSIS — G56.01 RIGHT CARPAL TUNNEL SYNDROME: ICD-10-CM

## 2024-02-22 DIAGNOSIS — M77.11 RIGHT TENNIS ELBOW: Primary | ICD-10-CM

## 2024-02-22 DIAGNOSIS — G56.01 CARPAL TUNNEL SYNDROME OF RIGHT WRIST: ICD-10-CM

## 2024-02-22 DIAGNOSIS — M77.11 RIGHT LATERAL EPICONDYLITIS: ICD-10-CM

## 2024-03-22 NOTE — PERIOP NOTE
Patient verified name and .  Order for consent not found in EHR patient verifies procedure.   Type 1B surgery, Phone assessment complete.  Orders not received.  Labs per surgeon: none  Labs per anesthesia protocol: none    Patient answered medical/surgical history questions at their best of ability. All prior to admission medications documented in EPIC.    Patient instructed to continue taking all prescription medications up to the day of surgery but to take only the following medications the day of surgery according to anesthesia guidelines with a small sip of water: none Also, patient is requested to take 2 Tylenol in the morning and then again before bed on the day before surgery. Regular or extra strength may be used.       Patient informed that all vitamins and supplements should be held 7 days prior to surgery and NSAIDS 5 days prior to surgery. Prescription meds to hold:none    Patient instructed on the following:    > Arrive at [facility] Entr[facility], time of arrival to be called the day before by 1700  > NPO after midnight, unless otherwise indicated, including gum, mints, and ice chips  > Responsible adult must drive patient to the hospital, stay during surgery, and patient will need supervision 24 hours after anesthesia  > Use non moisturizing soap in shower the night before surgery and on the morning of surgery  > All piercings must be removed prior to arrival.    > Leave all valuables (money and jewelry) at home but bring insurance card and ID on DOS.   > You may be required to pay a deductible or co-pay on the day of your procedure. You can pre-pay by calling [phone] if your surgery is at the [facility] or [phone] if your surgery is at the [facility].  > Do not wear make-up, nail polish, lotions, cologne, perfumes, powders, or oil on skin. Artificial nails are not permitted.

## 2024-03-27 ENCOUNTER — ANESTHESIA EVENT (OUTPATIENT)
Dept: SURGERY | Age: 49
End: 2024-03-27
Payer: COMMERCIAL

## 2024-03-27 DIAGNOSIS — R20.2 NUMBNESS AND TINGLING OF RIGHT UPPER EXTREMITY: ICD-10-CM

## 2024-03-27 DIAGNOSIS — M77.11 RIGHT TENNIS ELBOW: Primary | ICD-10-CM

## 2024-03-27 DIAGNOSIS — R20.0 NUMBNESS AND TINGLING OF RIGHT UPPER EXTREMITY: ICD-10-CM

## 2024-03-27 NOTE — PROGRESS NOTES
Preop department called to notify patient of arrival time for scheduled procedure. Instructions given to   - Arrive at OPC Entrance [address]  - Remain NPO after midnight, unless otherwise indicated, including gum, mints, and ice chips.   - Have a responsible adult to drive patient to the hospital, stay during surgery, and patient will need supervision 24 hours after anesthesia.   - Use antibacterial soap in shower the night before surgery and on the morning of surgery.       Was patient contacted: yes, self  Voicemail left: n/a  Numbers contacted: [phone]   Arrival time: 0730

## 2024-03-28 ENCOUNTER — HOSPITAL ENCOUNTER (OUTPATIENT)
Age: 49
Setting detail: OUTPATIENT SURGERY
Discharge: HOME | End: 2024-03-28
Attending: ORTHOPAEDIC SURGERY | Admitting: ORTHOPAEDIC SURGERY
Payer: COMMERCIAL

## 2024-03-28 ENCOUNTER — ANESTHESIA (OUTPATIENT)
Dept: SURGERY | Age: 49
End: 2024-03-28
Payer: COMMERCIAL

## 2024-03-28 VITALS
RESPIRATION RATE: 17 BRPM | DIASTOLIC BLOOD PRESSURE: 57 MMHG | TEMPERATURE: 97.7 F | WEIGHT: 160 LBS | HEIGHT: 62 IN | HEART RATE: 65 BPM | BODY MASS INDEX: 29.44 KG/M2 | SYSTOLIC BLOOD PRESSURE: 121 MMHG | OXYGEN SATURATION: 94 %

## 2024-03-28 PROCEDURE — C1713 ANCHOR/SCREW BN/BN,TIS/BN: HCPCS | Performed by: ORTHOPAEDIC SURGERY

## 2024-03-28 PROCEDURE — 2720000010 HC SURG SUPPLY STERILE: Performed by: ORTHOPAEDIC SURGERY

## 2024-03-28 PROCEDURE — 7100000011 HC PHASE II RECOVERY - ADDTL 15 MIN: Performed by: ORTHOPAEDIC SURGERY

## 2024-03-28 PROCEDURE — 7100000000 HC PACU RECOVERY - FIRST 15 MIN: Performed by: ORTHOPAEDIC SURGERY

## 2024-03-28 PROCEDURE — 7100000010 HC PHASE II RECOVERY - FIRST 15 MIN: Performed by: ORTHOPAEDIC SURGERY

## 2024-03-28 PROCEDURE — 2709999900 HC NON-CHARGEABLE SUPPLY: Performed by: ORTHOPAEDIC SURGERY

## 2024-03-28 PROCEDURE — 6360000002 HC RX W HCPCS: Performed by: ANESTHESIOLOGY

## 2024-03-28 PROCEDURE — 6370000000 HC RX 637 (ALT 250 FOR IP): Performed by: ANESTHESIOLOGY

## 2024-03-28 PROCEDURE — 3700000001 HC ADD 15 MINUTES (ANESTHESIA): Performed by: ORTHOPAEDIC SURGERY

## 2024-03-28 PROCEDURE — 2580000003 HC RX 258: Performed by: ANESTHESIOLOGY

## 2024-03-28 PROCEDURE — 64415 NJX AA&/STRD BRCH PLXS IMG: CPT | Performed by: ANESTHESIOLOGY

## 2024-03-28 PROCEDURE — 6360000002 HC RX W HCPCS: Performed by: REGISTERED NURSE

## 2024-03-28 PROCEDURE — 6360000002 HC RX W HCPCS: Performed by: NURSE PRACTITIONER

## 2024-03-28 PROCEDURE — 7100000001 HC PACU RECOVERY - ADDTL 15 MIN: Performed by: ORTHOPAEDIC SURGERY

## 2024-03-28 PROCEDURE — 3600000013 HC SURGERY LEVEL 3 ADDTL 15MIN: Performed by: ORTHOPAEDIC SURGERY

## 2024-03-28 PROCEDURE — 2500000003 HC RX 250 WO HCPCS: Performed by: REGISTERED NURSE

## 2024-03-28 PROCEDURE — 3600000003 HC SURGERY LEVEL 3 BASE: Performed by: ORTHOPAEDIC SURGERY

## 2024-03-28 PROCEDURE — 3700000000 HC ANESTHESIA ATTENDED CARE: Performed by: ORTHOPAEDIC SURGERY

## 2024-03-28 DEVICE — 1.8MM Q-FIX ALL SUTURE ANCHOR
Type: IMPLANTABLE DEVICE | Site: ELBOW | Status: FUNCTIONAL
Brand: Q-FIX

## 2024-03-28 RX ORDER — ONDANSETRON 4 MG/1
4 TABLET, FILM COATED ORAL EVERY 8 HOURS PRN
Qty: 20 TABLET | Refills: 0 | Status: SHIPPED | OUTPATIENT
Start: 2024-03-28

## 2024-03-28 RX ORDER — EPHEDRINE SULFATE 5 MG/ML
INJECTION INTRAVENOUS PRN
Status: DISCONTINUED | OUTPATIENT
Start: 2024-03-28 | End: 2024-03-28 | Stop reason: SDUPTHER

## 2024-03-28 RX ORDER — SODIUM CHLORIDE 9 MG/ML
INJECTION, SOLUTION INTRAVENOUS PRN
Status: DISCONTINUED | OUTPATIENT
Start: 2024-03-28 | End: 2024-03-28 | Stop reason: HOSPADM

## 2024-03-28 RX ORDER — SODIUM CHLORIDE 0.9 % (FLUSH) 0.9 %
5-40 SYRINGE (ML) INJECTION PRN
Status: DISCONTINUED | OUTPATIENT
Start: 2024-03-28 | End: 2024-03-28 | Stop reason: HOSPADM

## 2024-03-28 RX ORDER — HYDROMORPHONE HYDROCHLORIDE 2 MG/ML
0.25 INJECTION, SOLUTION INTRAMUSCULAR; INTRAVENOUS; SUBCUTANEOUS EVERY 5 MIN PRN
Status: DISCONTINUED | OUTPATIENT
Start: 2024-03-28 | End: 2024-03-28 | Stop reason: HOSPADM

## 2024-03-28 RX ORDER — ACETAMINOPHEN 500 MG
1000 TABLET ORAL ONCE
Status: COMPLETED | OUTPATIENT
Start: 2024-03-28 | End: 2024-03-28

## 2024-03-28 RX ORDER — LIDOCAINE HYDROCHLORIDE 20 MG/ML
INJECTION, SOLUTION EPIDURAL; INFILTRATION; INTRACAUDAL; PERINEURAL PRN
Status: DISCONTINUED | OUTPATIENT
Start: 2024-03-28 | End: 2024-03-28 | Stop reason: SDUPTHER

## 2024-03-28 RX ORDER — HYDROMORPHONE HYDROCHLORIDE 2 MG/ML
0.5 INJECTION, SOLUTION INTRAMUSCULAR; INTRAVENOUS; SUBCUTANEOUS EVERY 5 MIN PRN
Status: DISCONTINUED | OUTPATIENT
Start: 2024-03-28 | End: 2024-03-28 | Stop reason: HOSPADM

## 2024-03-28 RX ORDER — PROCHLORPERAZINE EDISYLATE 5 MG/ML
5 INJECTION INTRAMUSCULAR; INTRAVENOUS
Status: DISCONTINUED | OUTPATIENT
Start: 2024-03-28 | End: 2024-03-28 | Stop reason: HOSPADM

## 2024-03-28 RX ORDER — SODIUM CHLORIDE 0.9 % (FLUSH) 0.9 %
5-40 SYRINGE (ML) INJECTION EVERY 12 HOURS SCHEDULED
Status: DISCONTINUED | OUTPATIENT
Start: 2024-03-28 | End: 2024-03-28 | Stop reason: HOSPADM

## 2024-03-28 RX ORDER — HYDROCODONE BITARTRATE AND ACETAMINOPHEN 5; 325 MG/1; MG/1
1 TABLET ORAL EVERY 6 HOURS PRN
Qty: 20 TABLET | Refills: 0 | Status: SHIPPED | OUTPATIENT
Start: 2024-03-28 | End: 2024-04-02

## 2024-03-28 RX ORDER — FENTANYL CITRATE 50 UG/ML
100 INJECTION, SOLUTION INTRAMUSCULAR; INTRAVENOUS
Status: COMPLETED | OUTPATIENT
Start: 2024-03-28 | End: 2024-03-28

## 2024-03-28 RX ORDER — NALOXONE HYDROCHLORIDE 0.4 MG/ML
INJECTION, SOLUTION INTRAMUSCULAR; INTRAVENOUS; SUBCUTANEOUS PRN
Status: DISCONTINUED | OUTPATIENT
Start: 2024-03-28 | End: 2024-03-28 | Stop reason: HOSPADM

## 2024-03-28 RX ORDER — LIDOCAINE HYDROCHLORIDE 10 MG/ML
1 INJECTION, SOLUTION INFILTRATION; PERINEURAL
Status: DISCONTINUED | OUTPATIENT
Start: 2024-03-28 | End: 2024-03-28 | Stop reason: HOSPADM

## 2024-03-28 RX ORDER — SODIUM CHLORIDE, SODIUM LACTATE, POTASSIUM CHLORIDE, CALCIUM CHLORIDE 600; 310; 30; 20 MG/100ML; MG/100ML; MG/100ML; MG/100ML
INJECTION, SOLUTION INTRAVENOUS CONTINUOUS
Status: DISCONTINUED | OUTPATIENT
Start: 2024-03-28 | End: 2024-03-28 | Stop reason: HOSPADM

## 2024-03-28 RX ORDER — HYDRALAZINE HYDROCHLORIDE 20 MG/ML
10 INJECTION INTRAMUSCULAR; INTRAVENOUS
Status: DISCONTINUED | OUTPATIENT
Start: 2024-03-28 | End: 2024-03-28 | Stop reason: HOSPADM

## 2024-03-28 RX ORDER — ONDANSETRON 2 MG/ML
4 INJECTION INTRAMUSCULAR; INTRAVENOUS
Status: DISCONTINUED | OUTPATIENT
Start: 2024-03-28 | End: 2024-03-28 | Stop reason: HOSPADM

## 2024-03-28 RX ORDER — ROPIVACAINE HYDROCHLORIDE 5 MG/ML
INJECTION, SOLUTION EPIDURAL; INFILTRATION; PERINEURAL
Status: COMPLETED | OUTPATIENT
Start: 2024-03-28 | End: 2024-03-28

## 2024-03-28 RX ORDER — DEXAMETHASONE SODIUM PHOSPHATE 4 MG/ML
INJECTION, SOLUTION INTRA-ARTICULAR; INTRALESIONAL; INTRAMUSCULAR; INTRAVENOUS; SOFT TISSUE
Status: COMPLETED | OUTPATIENT
Start: 2024-03-28 | End: 2024-03-28

## 2024-03-28 RX ORDER — OXYCODONE HYDROCHLORIDE 5 MG/1
5 TABLET ORAL
Status: DISCONTINUED | OUTPATIENT
Start: 2024-03-28 | End: 2024-03-28 | Stop reason: HOSPADM

## 2024-03-28 RX ORDER — MIDAZOLAM HYDROCHLORIDE 2 MG/2ML
2 INJECTION, SOLUTION INTRAMUSCULAR; INTRAVENOUS
Status: COMPLETED | OUTPATIENT
Start: 2024-03-28 | End: 2024-03-28

## 2024-03-28 RX ORDER — PROPOFOL 10 MG/ML
INJECTION, EMULSION INTRAVENOUS PRN
Status: DISCONTINUED | OUTPATIENT
Start: 2024-03-28 | End: 2024-03-28 | Stop reason: SDUPTHER

## 2024-03-28 RX ORDER — LABETALOL HYDROCHLORIDE 5 MG/ML
10 INJECTION, SOLUTION INTRAVENOUS
Status: DISCONTINUED | OUTPATIENT
Start: 2024-03-28 | End: 2024-03-28 | Stop reason: HOSPADM

## 2024-03-28 RX ADMIN — DEXAMETHASONE SODIUM PHOSPHATE 5 MG: 4 INJECTION, SOLUTION INTRAMUSCULAR; INTRAVENOUS at 07:45

## 2024-03-28 RX ADMIN — LIDOCAINE HYDROCHLORIDE 50 MG: 20 INJECTION, SOLUTION EPIDURAL; INFILTRATION; INTRACAUDAL; PERINEURAL at 09:02

## 2024-03-28 RX ADMIN — PROPOFOL 40 MG: 10 INJECTION, EMULSION INTRAVENOUS at 09:02

## 2024-03-28 RX ADMIN — ROPIVACAINE HYDROCHLORIDE 25 ML: 5 INJECTION, SOLUTION EPIDURAL; INFILTRATION; PERINEURAL at 07:45

## 2024-03-28 RX ADMIN — PROPOFOL 140 MCG/KG/MIN: 10 INJECTION, EMULSION INTRAVENOUS at 09:03

## 2024-03-28 RX ADMIN — MEPIVACAINE HYDROCHLORIDE 10 ML: 15 INJECTION, SOLUTION EPIDURAL; INFILTRATION at 07:45

## 2024-03-28 RX ADMIN — EPHEDRINE SULFATE 10 MG: 5 INJECTION INTRAVENOUS at 09:18

## 2024-03-28 RX ADMIN — MIDAZOLAM 2 MG: 1 INJECTION INTRAMUSCULAR; INTRAVENOUS at 07:45

## 2024-03-28 RX ADMIN — FENTANYL CITRATE 50 MCG: 0.05 INJECTION, SOLUTION INTRAMUSCULAR; INTRAVENOUS at 07:45

## 2024-03-28 RX ADMIN — SODIUM CHLORIDE, POTASSIUM CHLORIDE, SODIUM LACTATE AND CALCIUM CHLORIDE: 600; 310; 30; 20 INJECTION, SOLUTION INTRAVENOUS at 07:39

## 2024-03-28 RX ADMIN — Medication 2 G: at 09:04

## 2024-03-28 RX ADMIN — ACETAMINOPHEN 1000 MG: 500 TABLET, FILM COATED ORAL at 07:38

## 2024-03-28 NOTE — ANESTHESIA PROCEDURE NOTES
Peripheral Block    Patient location during procedure: pre-op  Reason for block: post-op pain management and at surgeon's request  Start time: 3/28/2024 7:45 AM  End time: 3/28/2024 7:49 AM  Staffing  Performed: anesthesiologist   Anesthesiologist: Dell Zelaya DO  Performed by: Dell Zelaya DO  Authorized by: Dell Zelaya DO    Preanesthetic Checklist  Completed: patient identified, IV checked, site marked, risks and benefits discussed, surgical/procedural consents, equipment checked, pre-op evaluation, timeout performed, anesthesia consent given, oxygen available and monitors applied/VS acknowledged  Peripheral Block   Patient position: supine  Prep: ChloraPrep  Provider prep: mask and sterile gloves  Patient monitoring: cardiac monitor, continuous pulse ox, frequent blood pressure checks, responsive to questions and oxygen  Block type: Brachial plexus  Supraclavicular  Laterality: right  Injection technique: single-shot  Guidance: ultrasound guided  Local infiltration: lidocaine  Infiltration strength: 1 %  Local infiltration: lidocaine  Dose: 3 mL    Needle   Needle type: insulated echogenic nerve stimulator needle   Needle gauge: 20 G  Needle localization: ultrasound guidance  Needle length: 10 cm  Assessment   Injection assessment: negative aspiration for heme, no paresthesia on injection, local visualized surrounding nerve on ultrasound and no intravascular symptoms  Paresthesia pain: none  Slow fractionated injection: yes  Hemodynamics: stable  Outcomes: uncomplicated and patient tolerated procedure well    Additional Notes  R/B/I discussed at length with pt including damage to nerve or muscle.    Needle inserted under real time Ultrasound guidance and placed in close proximity to nerve being blocked.  Ultrasound was used in real time to visualize spread of local anesthetic around nerve being blocked.  Nerve and surrounding structures appeared grossly normal.  A permanent

## 2024-03-28 NOTE — ANESTHESIA PRE PROCEDURE
Department of Anesthesiology  Preprocedure Note       Name:  Roxie Najera   Age:  48 y.o.  :  1975                                          MRN:  [id number]         Date:  3/28/2024      Surgeon: Surgeon(s):  Aramis Cruz MD    Procedure: Procedure(s):  IRRIGATION AND DEBRIDEMENT RIGHT LATERAL EPICONDYLE WITH POSSIBLE TENDON REATTACHMENT  CARPAL TUNNEL RELEASE ultra sound guided on the right    Medications prior to admission:   Prior to Admission medications    Medication Sig Start Date End Date Taking? Authorizing Provider   ondansetron (ZOFRAN) 4 MG tablet Take 1 tablet by mouth every 8 hours as needed for Nausea or Vomiting 3/28/24   Ana Moran APRN - CNP   HYDROcodone-acetaminophen (NORCO) 5-325 MG per tablet Take 1 tablet by mouth every 6 hours as needed for Pain for up to 5 days. Max Daily Amount: 4 tablets 3/28/24 4/2/24  Ana Moran APRN - COLLIN   lisdexamfetamine (VYVANSE) 50 MG capsule Take 1 capsule by mouth daily for 30 days. Max Daily Amount: 50 mg 3/20/24 4/19/24  Quan Perez MD   lisdexamfetamine (VYVANSE) 50 MG capsule Take 1 capsule by mouth daily for 30 days. Max Daily Amount: 50 mg 2/19/24 3/20/24  Quan Perez MD   lisdexamfetamine (VYVANSE) 50 MG capsule Take 1 capsule by mouth daily for 30 days. Max Daily Amount: 50 mg 3/17/24 4/16/24  Quan Perez MD   escitalopram (LEXAPRO) 5 MG tablet Take 1 tablet by mouth daily 24   Quan Perez MD   lisinopril (PRINIVIL;ZESTRIL) 10 MG tablet Take 1 tablet by mouth daily 24   Quan Perez MD   estradiol (VIVELLE) 0.05 MG/24HR Place 1 patch onto the skin Twice a Week 23  Provider, MD Ayo   cetirizine (ZYRTEC) 10 MG tablet Take by mouth    Automatic Reconciliation, Ar   vitamin D (CHOLECALCIFEROL) 25 MCG (1000 UT) TABS tablet Take by mouth daily    Automatic Reconciliation, Ar   EPINEPHrine (EPIPEN) 0.3 MG/0.3ML SOAJ injection Inject 0.3 mLs into the muscle once as needed    Automatic

## 2024-03-28 NOTE — OP NOTE
confirmed to be intact. The carpal tunnel region was then re-scanned.    The wound was irrigated with 5 ml of sterile normal saline. Excess fluid was manually expressed from the wound and the wound closed with Steri-Strips. The wound was dressed with gauze and compressive wrapping. There were no immediate complications.    All ultrasound images were stored     Attention was brought to the lateral epicondyle.    A lateral approach was used over the lateral epicondyle and extended distally for 3 cms. Bovie dissection was carried down to the fascia and the interval between EDC and ECRL was used to perform a decompressive fasciotomy. The underlying ECRB was identified, there was significant mucoid degeneration of the ECRB, this was sharply debrided with a knife until all devitalized tissue was excised. The radiocapitellar joint was incised and the cartilage was in good condition. Two S&N suture anchors were applied, one proximal on the lateral epicondyle and one more distal. The ECRB was repaired as well as the ECRL and EDC. The interval was closed with #2 fiberwire until a tight seal was achieved. The topaz radiofrequency wand was then used over the repaired tendons.     The tourniquet was deflated and hemostasis was ensured.  The wounds were then thoroughly irrigated and closed in layered fashion with 3-0 vicryl and 4-0 Stratafix.      Disposition: To PACU with no complications and follow up per routine.  Patient is instructed to remove dressings in five days and other precautions include avoidance of heavy and repetitive lifting for 2 weeks, when an appointment for follow up and suture removal will take place.     Aramis Cruz MD  03/28/24  11:53 AM

## 2024-03-28 NOTE — DISCHARGE INSTRUCTIONS
Postoperative  Instructions:      Weightbearing or Lifting:  Limit  weight  lifting  to  less  than  1  pound  (coffee  mug)  for  the  first  2  weeks  after  surgery.      Dressing  instructions:    Keep  your  dressing  and/or  splint  clean  and  dry  at  all  times.    You  can  remove  your  dressing  on  post-operative  day  #5  and  change  with  a  dry/sterile  dressing  or  Band-Aids  as  needed  thereafter.          Showering  Instructions:  May  shower  But keep surgical dressing clean and dry until removed as explained above. After dressing is removed, you may allow soapy water to run through the incision during showers but do not scrub. After each shower, pat dry and apply a dry dressing. Do  not  soak  your  Incision in still water or bathtub  for  3  weeks  after  surgery.    If  the  incision  gets  wet otherwise,  pat  dry  and  do  not  scrub  the  incision.  Do  not  apply  cream  or  lotion  to  incision      Pain  Control:  - You  have  been  given  a  prescription  to  be  taken  as  directed  for  post-operative  pain  control.    In  addition,  elevate  the  operative  extremity  above  the  heart  at  all  times  to  prevent  swelling  and  throbbing  pain.   - If you develop constipation while taking narcotic pain medications (Norco, Hydrocodone, Percocet, Oxycodone, Dilaudid, Hydromorphone) take  over-the-counter  Colace,  100mg  by  mouth  twice  a  Day.     - Nausea  is  a  common  side  effect  of  many  pain  medications.  You  will  want  to  eat something  before  taking  your  pain  medicine  to  help  prevent  Nausea.  - If  you  are  taking  a  prescription  pain  medication  that  contains  acetaminophen,  we  recommend  that  you  do  not  take  additional  over  the  counter  acetaminophen  (Tylenol®).      Other  pain  relieving  options:   - Using  a  cold  pack  to  ice  the  affected  area  a  few  times  a  day  (15  to  20  minutes  at  a  time)  can  help  to  relieve

## 2024-03-28 NOTE — H&P
History and Physical    Subjective:     Roxie Najera is a 48 y.o. scheduled for right US CTR and tennis elbow surgery today.    Past Medical History:   Diagnosis Date    ADD (attention deficit disorder)     Allergic rhinitis, cause unspecified     PRN inhaler-last used end of 10/2020    Anxiety     Asthma     Essential hypertension, benign     controlled with medication     Fever blister     PRN medication     GERD (gastroesophageal reflux disease)     no medication at this time     Hearing loss 1995    right    Nicotine addiction     history     Pure hypercholesterolemia     Ureteral calculus     Vitamin D deficiency       Past Surgical History:   Procedure Laterality Date    APPENDECTOMY      HYSTERECTOMY (CERVIX STATUS UNKNOWN)      @34    LITHOTRIPSY      PARTIAL HYSTERECTOMY (CERVIX NOT REMOVED)      TONSILLECTOMY       Social History     Tobacco Use    Smoking status: Former     Current packs/day: 0.00     Types: Cigarettes     Quit date: 12/21/2013     Years since quitting: 10.2    Smokeless tobacco: Never   Substance Use Topics    Alcohol use: Not Currently       Allergies   Allergen Reactions    Peanut-Containing Drug Products Anaphylaxis    Codeine Other (See Comments)    Adhesive Tape Itching and Rash        Review of Systems:  A comprehensive review of systems was negative except for that written in the History of Present Illness.     Objective:     Physical Exam:   NAD, heart with regular rate and rhythm, lungs clear to auscultation    Extremity exam:  Examination on the rightupper extremity demonstrates decreased sensation to light touch in the median and ulnar distribution, cap refill < 5 seconds in all fingers.  There is severe tenderness on the lateral epicondyle, no tenderness on the medial epicondyle or the olecranon, pain on the lateral elbow is aggravated by resisted wrist and finger extension, there is no pain with palpation of the radial tunnel, negative Tinel along the radial

## 2024-03-28 NOTE — ANESTHESIA POSTPROCEDURE EVALUATION
Department of Anesthesiology  Postprocedure Note    Patient: Roxie Najera  MRN: [id number]  YOB: 1975  Date of evaluation: 3/28/2024    Procedure Summary       Date: 03/28/24 Room / Location: [facility] OP OR 07 / [facility]    Anesthesia Start: 0858 Anesthesia Stop: 0955    Procedures:       IRRIGATION AND DEBRIDEMENT RIGHT LATERAL EPICONDYLE WITH POSSIBLE TENDON REATTACHMENT (Right: Hand)      CARPAL TUNNEL RELEASE ultra sound guided on the right (Right: Hand) Diagnosis:       Carpal tunnel syndrome of right wrist      Right lateral epicondylitis      (Carpal tunnel syndrome of right wrist [G56.01])      (Right lateral epicondylitis [M77.11])    Surgeons: Aramis Cruz MD Responsible Provider: Dell Zelaya DO    Anesthesia Type: TIVA ASA Status: 2            Anesthesia Type: No value filed.    Carli Phase I: Carli Score: 8    Carli Phase II: Carli Score: 9    Anesthesia Post Evaluation    Patient location during evaluation: PACU  Level of consciousness: awake and alert  Airway patency: patent  Nausea & Vomiting: no nausea  Cardiovascular status: hemodynamically stable  Respiratory status: acceptable  Hydration status: euvolemic  Pain management: satisfactory to patient    No notable events documented.

## 2024-04-09 ENCOUNTER — EVALUATION (OUTPATIENT)
Age: 49
End: 2024-04-09
Payer: COMMERCIAL

## 2024-04-09 ENCOUNTER — OFFICE VISIT (OUTPATIENT)
Dept: ORTHOPEDIC SURGERY | Age: 49
End: 2024-04-09

## 2024-04-09 DIAGNOSIS — R20.2 NUMBNESS AND TINGLING OF RIGHT UPPER EXTREMITY: Primary | ICD-10-CM

## 2024-04-09 DIAGNOSIS — R20.0 NUMBNESS AND TINGLING OF RIGHT UPPER EXTREMITY: Primary | ICD-10-CM

## 2024-04-09 DIAGNOSIS — M25.631 STIFFNESS OF RIGHT WRIST JOINT: ICD-10-CM

## 2024-04-09 DIAGNOSIS — M25.531 RIGHT WRIST PAIN: ICD-10-CM

## 2024-04-09 DIAGNOSIS — M77.11 RIGHT TENNIS ELBOW: ICD-10-CM

## 2024-04-09 DIAGNOSIS — M25.521 RIGHT ELBOW PAIN: Primary | ICD-10-CM

## 2024-04-09 DIAGNOSIS — M25.621 STIFFNESS OF RIGHT ELBOW JOINT: ICD-10-CM

## 2024-04-09 PROCEDURE — 99024 POSTOP FOLLOW-UP VISIT: CPT | Performed by: NURSE PRACTITIONER

## 2024-04-09 PROCEDURE — 97110 THERAPEUTIC EXERCISES: CPT | Performed by: OCCUPATIONAL THERAPIST

## 2024-04-09 PROCEDURE — 97165 OT EVAL LOW COMPLEX 30 MIN: CPT | Performed by: OCCUPATIONAL THERAPIST

## 2024-04-09 NOTE — PROGRESS NOTES
Orthopaedic Hand Surgery Note    Name: Roxie Najera  YOB: 1975  Gender: female  MRN: [id number]    Post Operative Visit: Irrigation And Debridement Right Lateral Epicondyle With Possible Tendon Reattachment - Right and CARPAL TUNNEL RELEASE ultra sound guided on the right - Right    HPI: Patient is status post Irrigation And Debridement Right Lateral Epicondyle With Possible Tendon Reattachment - Right and CARPAL TUNNEL RELEASE ultra sound guided on the right - Right on 3/28/2024. Doing well. Pleased with current progress. Denies fevers or chills.  Patient reports pain from prior to surgery is better.  She complains of normal expected postoperative pain.    Physical Examination:  Wound healing well.  Sutures are intact with no erythema or drainage.  sensation is intact in all fingers. Motor exam reveals no deficits. Good finger and wrist range of motion.    Imaging:     None    Assessment:   1. Numbness and tingling of right upper extremity    2. Right tennis elbow         Status post Irrigation And Debridement Right Lateral Epicondyle With Possible Tendon Reattachment - Right and CARPAL TUNNEL RELEASE ultra sound guided on the right - Right on 3/28/2024    Plan:  We discussed the post operative course and progression.  We will remove her sutures.  She has an appointment with therapy.  We discussed restrictions in detail.  She will continue with her forearm lacer.  We will reassess her progress back in 4 weeks.  We will give her a work note.    DELIA Booth - CNP  04/09/24  9:48 AM

## 2024-04-09 NOTE — PROGRESS NOTES
[facility] ORTHOPAEDICS  [address] [place] [id number]  Dept: [phone]      Occupational Therapy Initial Assessment     Referring MD: Aramis Cruz MD    Diagnosis:     ICD-10-CM    1. Right elbow pain  M25.521       2. Right wrist pain  M25.531       3. Stiffness of right elbow joint  M25.621       4. Stiffness of right wrist joint  M25.631            Surgery: Date 3/28/24 Right Ultrasound-Guided Carpal Tunnel release, right lateral epicondyle debridement and Decompressive fasciotomy of extensor-supinator mass with debridement of devitalized extensor tendon     Therapy precautions: Tendon precautions    History of injury/onset : gradually worsening elbow and wrist pain    Payor: Payor: BCBS /  /  /  Billing pattern: Commercial- substantial/midpoint time each CPT  Total Direct Treatment Time: 15 min                       Total In Office Time: 40 min  Modifier needed: No  Episode visit count:  1     Preferred Name:  Jeana    PERTINENT MEDICAL HISTORY     PMHX & Meds:   Past Medical History:   Diagnosis Date    ADD (attention deficit disorder)     Allergic rhinitis, cause unspecified     PRN inhaler-last used end of 10/2020    Anxiety     Asthma     Essential hypertension, benign     controlled with medication     Fever blister     PRN medication     GERD (gastroesophageal reflux disease)     no medication at this time     Hearing loss 1995    right    Nicotine addiction     history     Pure hypercholesterolemia     Ureteral calculus     Vitamin D deficiency    ,   Past Surgical History:   Procedure Laterality Date    APPENDECTOMY      CARPAL TUNNEL RELEASE Right 3/28/2024    CARPAL TUNNEL RELEASE ultra sound guided on the right performed by Aramis Cruz MD at [facility]    ELBOW SURGERY Right 3/28/2024    IRRIGATION AND DEBRIDEMENT RIGHT LATERAL EPICONDYLE WITH POSSIBLE TENDON REATTACHMENT performed by Aramis Cruz MD at [facility]    HYSTERECTOMY (CERVIX STATUS UNKNOWN)      @36    LITHOTRIPSY

## 2024-04-17 ENCOUNTER — TREATMENT (OUTPATIENT)
Age: 49
End: 2024-04-17
Payer: COMMERCIAL

## 2024-04-17 ENCOUNTER — TELEMEDICINE (OUTPATIENT)
Dept: FAMILY MEDICINE CLINIC | Facility: CLINIC | Age: 49
End: 2024-04-17
Payer: COMMERCIAL

## 2024-04-17 DIAGNOSIS — M25.621 STIFFNESS OF RIGHT ELBOW JOINT: ICD-10-CM

## 2024-04-17 DIAGNOSIS — M25.521 RIGHT ELBOW PAIN: Primary | ICD-10-CM

## 2024-04-17 DIAGNOSIS — F98.8 ATTENTION DEFICIT DISORDER (ADD) WITHOUT HYPERACTIVITY: ICD-10-CM

## 2024-04-17 PROCEDURE — 97010 HOT OR COLD PACKS THERAPY: CPT | Performed by: OCCUPATIONAL THERAPIST

## 2024-04-17 PROCEDURE — 99213 OFFICE O/P EST LOW 20 MIN: CPT | Performed by: FAMILY MEDICINE

## 2024-04-17 PROCEDURE — 97110 THERAPEUTIC EXERCISES: CPT | Performed by: OCCUPATIONAL THERAPIST

## 2024-04-17 RX ORDER — LISDEXAMFETAMINE DIMESYLATE CAPSULES 50 MG/1
50 CAPSULE ORAL DAILY
Qty: 30 CAPSULE | Refills: 0 | Status: SHIPPED | OUTPATIENT
Start: 2024-06-18 | End: 2024-07-18

## 2024-04-17 RX ORDER — LISDEXAMFETAMINE DIMESYLATE CAPSULES 50 MG/1
50 CAPSULE ORAL DAILY
Qty: 30 CAPSULE | Refills: 0 | Status: SHIPPED | OUTPATIENT
Start: 2024-07-19 | End: 2024-08-18

## 2024-04-17 RX ORDER — LISDEXAMFETAMINE DIMESYLATE CAPSULES 50 MG/1
50 CAPSULE ORAL DAILY
Qty: 30 CAPSULE | Refills: 0 | Status: SHIPPED | OUTPATIENT
Start: 2024-05-18 | End: 2024-06-17

## 2024-04-17 RX ORDER — METHYLPREDNISOLONE 4 MG/1
TABLET ORAL
Qty: 1 KIT | Refills: 0 | OUTPATIENT
Start: 2024-04-17

## 2024-04-17 SDOH — ECONOMIC STABILITY: FOOD INSECURITY: WITHIN THE PAST 12 MONTHS, THE FOOD YOU BOUGHT JUST DIDN'T LAST AND YOU DIDN'T HAVE MONEY TO GET MORE.: NEVER TRUE

## 2024-04-17 SDOH — ECONOMIC STABILITY: FOOD INSECURITY: WITHIN THE PAST 12 MONTHS, YOU WORRIED THAT YOUR FOOD WOULD RUN OUT BEFORE YOU GOT MONEY TO BUY MORE.: NEVER TRUE

## 2024-04-17 SDOH — ECONOMIC STABILITY: INCOME INSECURITY: HOW HARD IS IT FOR YOU TO PAY FOR THE VERY BASICS LIKE FOOD, HOUSING, MEDICAL CARE, AND HEATING?: NOT HARD AT ALL

## 2024-04-17 ASSESSMENT — PATIENT HEALTH QUESTIONNAIRE - PHQ9
SUM OF ALL RESPONSES TO PHQ QUESTIONS 1-9: 0
SUM OF ALL RESPONSES TO PHQ9 QUESTIONS 1 & 2: 0
SUM OF ALL RESPONSES TO PHQ QUESTIONS 1-9: 0
2. FEELING DOWN, DEPRESSED OR HOPELESS: NOT AT ALL
SUM OF ALL RESPONSES TO PHQ QUESTIONS 1-9: 0
1. LITTLE INTEREST OR PLEASURE IN DOING THINGS: NOT AT ALL
SUM OF ALL RESPONSES TO PHQ QUESTIONS 1-9: 0

## 2024-04-17 NOTE — PROGRESS NOTES
[facility] ORTHOPAEDICS  [address] [place] [id number]  Dept: [phone]      Occupational Therapy Daily Note     Referring MD: Aramis Cruz MD    Diagnosis:     ICD-10-CM    1. Right elbow pain  M25.521       2. Stiffness of right elbow joint  M25.621         Surgery: Date 3/28/24 Right Ultrasound-Guided Carpal Tunnel release, right lateral epicondyle debridement and Decompressive fasciotomy of extensor-supinator mass with debridement of devitalized extensor tendon     Therapy precautions: Tendon precautions    History of injury/onset : gradually worsening elbow and wrist pain    Payor: Payor: BCBS /  /  /  Billing pattern: Commercial- substantial/midpoint time each CPT  Total Direct Treatment Time: 45 min                       Total In Office Time: 45 min  Modifier needed: No  Episode visit count:  2     Preferred Name:  Jeana      SUBJECTIVE     Current Symptoms/Chief complaints:   Chief Complaint   Patient presents with    Elbow Pain     Reports greatly increased medial elbow pain and \"funny bone\" irritation yesterday.  OBJECTIVE     Functional Outcome Measures: Quick Dash  51 score=   90.91 % functional deficit    Swelling/Edema: moderate elbow and wrist  R elbow: 26.2 cm  L elbow: 24.8 cm       RIGHT 4/9/24      Elbow extension/flexion 40/135°       Supination/Pronation 85/88 (end range tightness)°          RIGHT 4/9/24 LEFT      Wrist Extension/Flexion 59/45°  66/69°      RD/UD °  °        Opposition (Kapandji): 10          Treatment:   Therapeutic exercise (34184) x 45 min:  Home Exercise Program development and Education: see below.  Patient I with program following instruction and performance  Use of heat and ice as needed for pain and inflammation reduction. Precautions reviewed.  OT POC and rationale, education for injury prevention, pain management at lateral elbow and edema management.  PROM by therapist to the elbow   Ulnar nerve gliding by therapist supine  STM to

## 2024-04-17 NOTE — PROGRESS NOTES
Subjective:      Roxie Najera is a 48 y.o. female Roxie Najera, was evaluated through a synchronous (real-time) audio-video encounter. The patient (or guardian if applicable) is aware that this is a billable service, which includes applicable co-pays. This Virtual Visit was conducted with patient's (and/or legal guardian's) consent. Patient identification was verified, and a caregiver was present when appropriate.   The patient was located at Home: [address] [place] [id number]  Provider was located at Facility (Appt Dept): [address] [place] [address] [address]  [place] [address]  Confirm you are appropriately licensed, registered, or certified to deliver care in the state where the patient is located as indicated above. If you are not or unsure, please re-schedule the visit: Yes, I confirm.       Presenting for follow up of ADD.  Doing well, no side effects.    Allergies   Allergen Reactions    Peanut-Containing Drug Products Anaphylaxis    Codeine Other (See Comments)    Adhesive Tape Itching and Rash     Patient Active Problem List   Diagnosis    Pure hypercholesterolemia    GERD (gastroesophageal reflux disease)    Essential hypertension    DANTE (generalized anxiety disorder)    Bipolar disease, chronic (HCC)    ADD (attention deficit disorder)    Vertigo    History of colonoscopy    Left ureteral stone    Hormone replacement therapy    Numbness and tingling of right upper extremity    Right elbow pain    Right tennis elbow    Carpal tunnel syndrome of right wrist    Right lateral epicondylitis         Objective:      Respirations approximately 16-20 breaths per minute    Constitutional: [x] Appears well-developed and well-nourished [x] No apparent distress      [] Abnormal -     Mental status: [x] Alert and awake  [x] Oriented to person/place/time [x] Able to follow commands    [] Abnormal -     Eyes:   EOM    [x]  Normal    [] Abnormal -   Sclera  [x]  Normal    [] Abnormal -

## 2024-04-22 ENCOUNTER — TREATMENT (OUTPATIENT)
Age: 49
End: 2024-04-22
Payer: COMMERCIAL

## 2024-04-22 DIAGNOSIS — M25.631 STIFFNESS OF RIGHT WRIST JOINT: ICD-10-CM

## 2024-04-22 DIAGNOSIS — M25.621 STIFFNESS OF RIGHT ELBOW JOINT: ICD-10-CM

## 2024-04-22 DIAGNOSIS — M25.531 RIGHT WRIST PAIN: ICD-10-CM

## 2024-04-22 DIAGNOSIS — M25.521 RIGHT ELBOW PAIN: Primary | ICD-10-CM

## 2024-04-22 PROCEDURE — 97110 THERAPEUTIC EXERCISES: CPT | Performed by: OCCUPATIONAL THERAPIST

## 2024-04-22 NOTE — PROGRESS NOTES
[facility] ORTHOPAEDICS  [address] [place] [phone]  Dept: [phone]      Occupational Therapy Daily Note     Referring MD: Aramis Cruz MD    Diagnosis:     ICD-10-CM    1. Right elbow pain  M25.521       2. Stiffness of right elbow joint  M25.621       3. Right wrist pain  M25.531       4. Stiffness of right wrist joint  M25.631           Surgery: Date 3/28/24 Right Ultrasound-Guided Carpal Tunnel release, right lateral epicondyle debridement and Decompressive fasciotomy of extensor-supinator mass with debridement of devitalized extensor tendon     Therapy precautions: Tendon precautions    History of injury/onset : gradually worsening elbow and wrist pain    Payor: Payor: BCBS /  /  /  Billing pattern: Commercial- substantial/midpoint time each CPT  Total Direct Treatment Time: 45 min                       Total In Office Time: 45 min  Modifier needed: No  Episode visit count:  3     Preferred Name:  Jeana      SUBJECTIVE     Current Symptoms/Chief complaints:   Chief Complaint   Patient presents with    Elbow Pain     States that she has been able to sleep through the night and that the steroids have helped significantly.      OBJECTIVE     Functional Outcome Measures: Quick Dash  51 score=   90.91 % functional deficit    Swelling/Edema: moderate elbow and wrist  R elbow: 26.2 cm  L elbow: 24.8 cm       RIGHT 4/9/24      Elbow extension/flexion 40/135°       Supination/Pronation 85/88 (end range tightness)°          RIGHT 4/9/24 LEFT      Wrist Extension/Flexion 59/45°  66/69°      RD/UD °  °        Opposition (Kapandji): 10          Treatment:   Therapeutic exercise (67570) x 45 min:  Home Exercise Program development and Education: see below.  Patient I with program following instruction and performance  PROM by therapist to the elbow   Ulnar nerve gliding by therapist supine  STM to the arm prior to ROM by therapist  MHP to UE prior to ROM in prep for ROM with extension

## 2024-04-29 ENCOUNTER — TREATMENT (OUTPATIENT)
Age: 49
End: 2024-04-29
Payer: COMMERCIAL

## 2024-04-29 DIAGNOSIS — M25.521 RIGHT ELBOW PAIN: Primary | ICD-10-CM

## 2024-04-29 DIAGNOSIS — M25.531 RIGHT WRIST PAIN: ICD-10-CM

## 2024-04-29 DIAGNOSIS — M25.621 STIFFNESS OF RIGHT ELBOW JOINT: ICD-10-CM

## 2024-04-29 PROCEDURE — 97010 HOT OR COLD PACKS THERAPY: CPT | Performed by: OCCUPATIONAL THERAPIST

## 2024-04-29 PROCEDURE — 97110 THERAPEUTIC EXERCISES: CPT | Performed by: OCCUPATIONAL THERAPIST

## 2024-04-29 NOTE — PROGRESS NOTES
[facility] ORTHOPAEDICS  [address] [place] [id number]  Dept: [phone]      Occupational Therapy Daily Note     Referring MD: Aramis Cruz MD    Diagnosis:     ICD-10-CM    1. Right elbow pain  M25.521       2. Stiffness of right elbow joint  M25.621       3. Right wrist pain  M25.531           Surgery: Date 3/28/24 Right Ultrasound-Guided Carpal Tunnel release, right lateral epicondyle debridement and Decompressive fasciotomy of extensor-supinator mass with debridement of devitalized extensor tendon     Therapy precautions: Tendon precautions    History of injury/onset : gradually worsening elbow and wrist pain    Payor: Payor: BCBS /  /  /  Billing pattern: Commercial- substantial/midpoint time each CPT  Total Direct Treatment Time: 45 min                       Total In Office Time: 50 min  Modifier needed: No  Episode visit count:  4     Preferred Name:  Jeana    SUBJECTIVE     Current Symptoms/Chief complaints:   Chief Complaint   Patient presents with    Elbow Pain     States she continues to feel better over all.  Is pleased with pain reduction.  Some soreness in the extensors with use of mouse last week.  OBJECTIVE     Functional Outcome Measures: Quick Dash  51 score=   90.91 % functional deficit    Swelling/Edema: moderate elbow and wrist  R elbow: 26.2 cm  L elbow: 24.8 cm       RIGHT 4/9/24 RIGHT 4/29/24     Elbow extension/flexion 40/135°  0/150     Supination/Pronation 85/88 (end range tightness)°          RIGHT 4/9/24 LEFT  RIGHT 4/29/24    Wrist Extension/Flexion 59/45°  66/69°  69/70    RD/UD °  °        Opposition (Kapandji): 10          Treatment:   Therapeutic exercise (06169) x 45 min:  Home Exercise Program development and Education: see below.  Patient I with program following instruction and performance  PROM by therapist to the elbow   STM and lengthening of extensors  Ulnar nerve gliding by therapist supine  STM to the arm prior to ROM by therapist  Hot Pack

## 2024-05-07 ENCOUNTER — TREATMENT (OUTPATIENT)
Age: 49
End: 2024-05-07
Payer: COMMERCIAL

## 2024-05-07 ENCOUNTER — OFFICE VISIT (OUTPATIENT)
Age: 49
End: 2024-05-07

## 2024-05-07 DIAGNOSIS — M77.11 RIGHT TENNIS ELBOW: Primary | ICD-10-CM

## 2024-05-07 DIAGNOSIS — M25.521 RIGHT ELBOW PAIN: Primary | ICD-10-CM

## 2024-05-07 DIAGNOSIS — M25.621 STIFFNESS OF RIGHT ELBOW JOINT: ICD-10-CM

## 2024-05-07 DIAGNOSIS — M25.521 RIGHT ELBOW PAIN: ICD-10-CM

## 2024-05-07 DIAGNOSIS — G56.01 RIGHT CARPAL TUNNEL SYNDROME: ICD-10-CM

## 2024-05-07 DIAGNOSIS — M25.531 RIGHT WRIST PAIN: ICD-10-CM

## 2024-05-07 PROCEDURE — 97140 MANUAL THERAPY 1/> REGIONS: CPT | Performed by: OCCUPATIONAL THERAPIST

## 2024-05-07 PROCEDURE — 99024 POSTOP FOLLOW-UP VISIT: CPT | Performed by: ORTHOPAEDIC SURGERY

## 2024-05-07 PROCEDURE — 97035 APP MDLTY 1+ULTRASOUND EA 15: CPT | Performed by: OCCUPATIONAL THERAPIST

## 2024-05-07 PROCEDURE — 97010 HOT OR COLD PACKS THERAPY: CPT | Performed by: OCCUPATIONAL THERAPIST

## 2024-05-07 PROCEDURE — 97110 THERAPEUTIC EXERCISES: CPT | Performed by: OCCUPATIONAL THERAPIST

## 2024-05-07 NOTE — PROGRESS NOTES
[facility] ORTHOPAEDICS  [address] [place] [phone]  Dept: [phone]      Occupational Therapy Daily Note     Referring MD: Aramis Cruz MD    Diagnosis:     ICD-10-CM    1. Right elbow pain  M25.521       2. Right wrist pain  M25.531       3. Stiffness of right elbow joint  M25.621           Surgery: Date 3/28/24 Right Ultrasound-Guided Carpal Tunnel release, right lateral epicondyle debridement and Decompressive fasciotomy of extensor-supinator mass with debridement of devitalized extensor tendon     Therapy precautions: Tendon precautions    History of injury/onset : gradually worsening elbow and wrist pain    Payor: Payor: BCBS /  /  /  Billing pattern: Commercial- substantial/midpoint time each CPT  Total Direct Treatment Time: 48 min                       Total In Office Time: 50 min  Modifier needed: No  Episode visit count:  5     Preferred Name:  Jeana    SUBJECTIVE     Current Symptoms/Chief complaints:   Chief Complaint   Patient presents with    Elbow Pain    Wrist Pain     States the elbow is pain free, having more volar wrist pain and swelling this date.  States that she removed the metal bar in her brace and this provided significant medial elbow relief.  OBJECTIVE     Functional Outcome Measures: Quick Dash  51 score=   90.91 % functional deficit    Swelling/Edema: moderate elbow and wrist  R elbow: 26.2 cm  L elbow: 24.8 cm       RIGHT 4/9/24 RIGHT 4/29/24     Elbow extension/flexion 40/135°  0/150     Supination/Pronation 85/88 (end range tightness)°          RIGHT 4/9/24 LEFT  RIGHT 4/29/24    Wrist Extension/Flexion 59/45°  66/69°  69/70    RD/UD °  °        Opposition (Kapandji): 10          Treatment:   Therapeutic exercise (19463) x 30 min:  Home Exercise Program development and Education: see below.  Patient I with program following instruction and performance  PROM by therapist to the elbow   STM and lengthening of extensors  STM to the arm and hand/wrist

## 2024-05-17 ENCOUNTER — TREATMENT (OUTPATIENT)
Age: 49
End: 2024-05-17
Payer: COMMERCIAL

## 2024-05-17 DIAGNOSIS — M25.531 RIGHT WRIST PAIN: ICD-10-CM

## 2024-05-17 DIAGNOSIS — M25.621 STIFFNESS OF RIGHT ELBOW JOINT: ICD-10-CM

## 2024-05-17 DIAGNOSIS — M25.631 STIFFNESS OF RIGHT WRIST JOINT: ICD-10-CM

## 2024-05-17 DIAGNOSIS — M25.521 RIGHT ELBOW PAIN: Primary | ICD-10-CM

## 2024-05-17 PROCEDURE — 97110 THERAPEUTIC EXERCISES: CPT | Performed by: OCCUPATIONAL THERAPIST

## 2024-05-17 PROCEDURE — 97140 MANUAL THERAPY 1/> REGIONS: CPT | Performed by: OCCUPATIONAL THERAPIST

## 2024-05-17 PROCEDURE — 97035 APP MDLTY 1+ULTRASOUND EA 15: CPT | Performed by: OCCUPATIONAL THERAPIST

## 2024-05-17 PROCEDURE — 97022 WHIRLPOOL THERAPY: CPT | Performed by: OCCUPATIONAL THERAPIST

## 2024-05-17 NOTE — PROGRESS NOTES
[facility] ORTHOPAEDICS  [address] [place] [phone]  Dept: [phone]      Occupational Therapy Daily Note     Referring MD: Aramis Cruz MD    Diagnosis:   No diagnosis found.      Surgery: Date 3/28/24 Right Ultrasound-Guided Carpal Tunnel release, right lateral epicondyle debridement and Decompressive fasciotomy of extensor-supinator mass with debridement of devitalized extensor tendon     Therapy precautions: Tendon precautions    History of injury/onset : gradually worsening elbow and wrist pain    Payor: Payor: BCBS /  /  /  Billing pattern: Commercial- substantial/midpoint time each CPT  Total Direct Treatment Time:33 min                       Total In Office Time: 45 min  Modifier needed: No  Episode visit count:  Visit count could not be calculated. Make sure you are using a visit which is associated with an episode.     Preferred Name:  Jeana    SUBJECTIVE     Current Symptoms/Chief complaints:   No chief complaint on file.    States she continues to improve functionally.    OBJECTIVE     Functional Outcome Measures: Quick Dash  51 score=   90.91 % functional deficit    Swelling/Edema: moderate elbow and wrist  R elbow: 26.2 cm  L elbow: 24.8 cm       RIGHT 4/9/24 RIGHT 4/29/24     Elbow extension/flexion 40/135°  0/150     Supination/Pronation 85/88 (end range tightness)°          RIGHT 4/9/24 LEFT  RIGHT 4/29/24    Wrist Extension/Flexion 59/45°  66/69°  69/70    RD/UD °  °        Opposition (Kapandji): 10          Treatment:   Fluidotherapy (33034) Therapist directed exercise in Fluidotherapy to right hand/wrist for preparation for tx and desensitization  Therapeutic exercise (99321) x 15 min:  Home Exercise Program development and Education: see below.  Patient I with program following instruction and performance  PROM by therapist to the elbow   STM and lengthening of extensors  STM to the arm and hand/wrist prior to ROM by therapist  Bolster rolling on table top for

## 2024-05-21 ENCOUNTER — TREATMENT (OUTPATIENT)
Age: 49
End: 2024-05-21
Payer: COMMERCIAL

## 2024-05-21 DIAGNOSIS — M25.521 RIGHT ELBOW PAIN: Primary | ICD-10-CM

## 2024-05-21 DIAGNOSIS — M25.531 RIGHT WRIST PAIN: ICD-10-CM

## 2024-05-21 DIAGNOSIS — M25.631 STIFFNESS OF RIGHT WRIST JOINT: ICD-10-CM

## 2024-05-21 DIAGNOSIS — M25.621 STIFFNESS OF RIGHT ELBOW JOINT: ICD-10-CM

## 2024-05-21 PROCEDURE — 97035 APP MDLTY 1+ULTRASOUND EA 15: CPT | Performed by: OCCUPATIONAL THERAPIST

## 2024-05-21 PROCEDURE — 97140 MANUAL THERAPY 1/> REGIONS: CPT | Performed by: OCCUPATIONAL THERAPIST

## 2024-05-21 PROCEDURE — 97110 THERAPEUTIC EXERCISES: CPT | Performed by: OCCUPATIONAL THERAPIST

## 2024-05-21 PROCEDURE — 97022 WHIRLPOOL THERAPY: CPT | Performed by: OCCUPATIONAL THERAPIST

## 2024-05-21 NOTE — PROGRESS NOTES
[facility] ORTHOPAEDICS  [address] [place] [phone]  Dept: [phone]      Occupational Therapy Daily Note     Referring MD: Aramis Cruz MD    Diagnosis:     ICD-10-CM    1. Right elbow pain  M25.521       2. Right wrist pain  M25.531       3. Stiffness of right elbow joint  M25.621       4. Stiffness of right wrist joint  M25.631             Surgery: Date 3/28/24 Right Ultrasound-Guided Carpal Tunnel release, right lateral epicondyle debridement and Decompressive fasciotomy of extensor-supinator mass with debridement of devitalized extensor tendon     Therapy precautions: Tendon precautions    History of injury/onset : gradually worsening elbow and wrist pain    Payor: Payor: BCBS /  /  /  Billing pattern: Commercial- substantial/midpoint time each CPT  Total Direct Treatment Time:33 min                       Total In Office Time: 45 min  Modifier needed: No  Episode visit count:  7     Preferred Name:  Jeana    SUBJECTIVE     Current Symptoms/Chief complaints:   Chief Complaint   Patient presents with    Elbow Pain    Wrist Pain     States she was discouraged yesterday with greatly increased pain.  Was able to address with splinting of the wrist over night and feels much better today.  OBJECTIVE     Functional Outcome Measures: Quick Dash  51 score=   90.91 % functional deficit    Swelling/Edema: moderate elbow and wrist  R elbow: 26.2 cm  L elbow: 24.8 cm       RIGHT 4/9/24 RIGHT 4/29/24     Elbow extension/flexion 40/135°  0/150     Supination/Pronation 85/88 (end range tightness)°          RIGHT 4/9/24 LEFT  RIGHT 4/29/24    Wrist Extension/Flexion 59/45°  66/69°  69/70    RD/UD °  °        Opposition (Kapandji): 10      Strength  Strength (psi): RIGHT  5/21/24 LEFT  5/21/24      Position II 27.8 44.4     Lat Pinch: 7.2 8.2     2pt pinch: 4.8 8.2     3pt pinch: 8.0 9.9          Treatment:   Fluidotherapy (99681) Therapist directed exercise in Fluidotherapy to right

## 2024-05-28 ENCOUNTER — TREATMENT (OUTPATIENT)
Age: 49
End: 2024-05-28
Payer: COMMERCIAL

## 2024-05-28 DIAGNOSIS — M25.631 STIFFNESS OF RIGHT WRIST JOINT: ICD-10-CM

## 2024-05-28 DIAGNOSIS — M25.621 STIFFNESS OF RIGHT ELBOW JOINT: ICD-10-CM

## 2024-05-28 DIAGNOSIS — M25.531 RIGHT WRIST PAIN: ICD-10-CM

## 2024-05-28 DIAGNOSIS — M25.521 RIGHT ELBOW PAIN: Primary | ICD-10-CM

## 2024-05-28 PROCEDURE — 97022 WHIRLPOOL THERAPY: CPT | Performed by: OCCUPATIONAL THERAPIST

## 2024-05-28 PROCEDURE — 97110 THERAPEUTIC EXERCISES: CPT | Performed by: OCCUPATIONAL THERAPIST

## 2024-05-28 PROCEDURE — 97140 MANUAL THERAPY 1/> REGIONS: CPT | Performed by: OCCUPATIONAL THERAPIST

## 2024-05-28 NOTE — PROGRESS NOTES
[facility] ORTHOPAEDICS  [address] [place] [phone]  Dept: [phone]      Occupational Therapy Daily Note     Referring MD: Aramis Cruz MD    Diagnosis:     ICD-10-CM    1. Right elbow pain  M25.521       2. Right wrist pain  M25.531       3. Stiffness of right elbow joint  M25.621       4. Stiffness of right wrist joint  M25.631               Surgery: Date 3/28/24 Right Ultrasound-Guided Carpal Tunnel release, right lateral epicondyle debridement and Decompressive fasciotomy of extensor-supinator mass with debridement of devitalized extensor tendon     Therapy precautions: Tendon precautions    History of injury/onset : gradually worsening elbow and wrist pain    Payor: Payor: BCBS /  /  /  Billing pattern: Commercial- substantial/midpoint time each CPT  Total Direct Treatment Time:55 min                       Total In Office Time: 60 min  Modifier needed: No  Episode visit count:  8     Preferred Name:  Jeana    SUBJECTIVE     Current Symptoms/Chief complaints:   Chief Complaint   Patient presents with    Elbow Pain    Wrist Pain     States that taping and sleeping with elbow extended helped pain significantly.  Causing increased stiffness in the elbow laterally however.  Is having to balance her presentation.    OBJECTIVE     Functional Outcome Measures: Quick Dash  51 score=   90.91 % functional deficit    Swelling/Edema: moderate elbow and wrist  R elbow: 26.2 cm  L elbow: 24.8 cm       RIGHT 4/9/24 RIGHT 4/29/24     Elbow extension/flexion 40/135°  0/150     Supination/Pronation 85/88 (end range tightness)°          RIGHT 4/9/24 LEFT  RIGHT 4/29/24    Wrist Extension/Flexion 59/45°  66/69°  69/70    RD/UD °  °        Opposition (Kapandji): 10      Strength  Strength (psi): RIGHT  5/21/24 LEFT  5/21/24      Position II 27.8 44.4     Lat Pinch: 7.2 8.2     2pt pinch: 4.8 8.2     3pt pinch: 8.0 9.9          Treatment:   Fluidotherapy (70244) Therapist directed exercise in

## 2024-06-04 ENCOUNTER — TREATMENT (OUTPATIENT)
Age: 49
End: 2024-06-04
Payer: COMMERCIAL

## 2024-06-04 DIAGNOSIS — M25.521 RIGHT ELBOW PAIN: Primary | ICD-10-CM

## 2024-06-04 DIAGNOSIS — M25.621 STIFFNESS OF RIGHT ELBOW JOINT: ICD-10-CM

## 2024-06-04 DIAGNOSIS — M25.531 RIGHT WRIST PAIN: ICD-10-CM

## 2024-06-04 PROCEDURE — 97110 THERAPEUTIC EXERCISES: CPT | Performed by: OCCUPATIONAL THERAPIST

## 2024-06-04 PROCEDURE — 97022 WHIRLPOOL THERAPY: CPT | Performed by: OCCUPATIONAL THERAPIST

## 2024-06-04 PROCEDURE — 97140 MANUAL THERAPY 1/> REGIONS: CPT | Performed by: OCCUPATIONAL THERAPIST

## 2024-06-04 NOTE — PROGRESS NOTES
[facility] OT [facility] ORTHOPAEDICS  [address] [place] [phone]  Dept: [phone]      Occupational Therapy Daily Note     Referring MD: Aramis Cruz MD    Diagnosis:     ICD-10-CM    1. Right elbow pain  M25.521       2. Right wrist pain  M25.531       3. Stiffness of right elbow joint  M25.621         Surgery: Date 3/28/24 Right Ultrasound-Guided Carpal Tunnel release, right lateral epicondyle debridement and Decompressive fasciotomy of extensor-supinator mass with debridement of devitalized extensor tendon     Therapy precautions: Tendon precautions    History of injury/onset : gradually worsening elbow and wrist pain    Payor: Payor: BCBS /  /  /  Billing pattern: Commercial- substantial/midpoint time each CPT  Total Direct Treatment Time:60 min                       Total In Office Time: 70 min  Modifier needed: No  Episode visit count:  9     Preferred Name:  Jeana    SUBJECTIVE     Current Symptoms/Chief complaints:   Chief Complaint   Patient presents with    Elbow Pain    Wrist Pain     States that she had a slight set back on Sunday after working around the house on Saturday.  Typing is not aggravating her at work.  She feels that it was just a lot of arm movements and lifting more than \"a couple of pounds\"  Resolves with Voltarin and taping.    OBJECTIVE     Functional Outcome Measures: Quick Dash  51 score=   90.91 % functional deficit at IE    Swelling/Edema: moderate elbow and wrist  R elbow: 26.2 cm  L elbow: 24.8 cm       RIGHT 4/9/24 RIGHT 4/29/24     Elbow extension/flexion 40/135°  0/150     Supination/Pronation 85/88 (end range tightness)°          RIGHT 4/9/24 LEFT  RIGHT 4/29/24    Wrist Extension/Flexion 59/45°  66/69°  69/70    RD/UD °  °        Opposition (Kapandji): 10      Strength  Strength (psi): RIGHT  5/21/24 LEFT  5/21/24      Position II 27.8 44.4     Lat Pinch: 7.2 8.2     2pt pinch: 4.8 8.2     3pt pinch: 8.0 9.9          Treatment:   Fluidotherapy (79646)

## 2024-06-11 ENCOUNTER — TREATMENT (OUTPATIENT)
Age: 49
End: 2024-06-11
Payer: COMMERCIAL

## 2024-06-11 DIAGNOSIS — M25.621 STIFFNESS OF RIGHT ELBOW JOINT: ICD-10-CM

## 2024-06-11 DIAGNOSIS — M25.531 RIGHT WRIST PAIN: ICD-10-CM

## 2024-06-11 DIAGNOSIS — M25.521 RIGHT ELBOW PAIN: Primary | ICD-10-CM

## 2024-06-11 PROCEDURE — 97140 MANUAL THERAPY 1/> REGIONS: CPT | Performed by: OCCUPATIONAL THERAPIST

## 2024-06-11 PROCEDURE — 97110 THERAPEUTIC EXERCISES: CPT | Performed by: OCCUPATIONAL THERAPIST

## 2024-06-11 PROCEDURE — 97035 APP MDLTY 1+ULTRASOUND EA 15: CPT | Performed by: OCCUPATIONAL THERAPIST

## 2024-06-11 PROCEDURE — 97022 WHIRLPOOL THERAPY: CPT | Performed by: OCCUPATIONAL THERAPIST

## 2024-06-12 RX ORDER — VALACYCLOVIR HYDROCHLORIDE 500 MG/1
500 TABLET, FILM COATED ORAL 2 TIMES DAILY PRN
Qty: 90 TABLET | Refills: 3 | Status: SHIPPED | OUTPATIENT
Start: 2024-06-12 | End: 2024-12-09

## 2024-06-12 NOTE — TELEPHONE ENCOUNTER
Stevie Message:    \"My pharmacy does have a prescription on fill for my Valtrex that I take when I have a fever blisters and I'm currently out and my chart will not let me submit a request to you .Is there anyway that someone can send a refill in for me.   Thank  you\"

## 2024-06-12 NOTE — PROGRESS NOTES
[facility] ORTHOPAEDICS  [address] [place] [phone]  Dept: [phone]      Occupational Therapy Daily Note     Referring MD: Aramis Cruz MD    Diagnosis:     ICD-10-CM    1. Right elbow pain  M25.521       2. Stiffness of right elbow joint  M25.621       3. Right wrist pain  M25.531         Surgery: Date 3/28/24 Right Ultrasound-Guided Carpal Tunnel release, right lateral epicondyle debridement and Decompressive fasciotomy of extensor-supinator mass with debridement of devitalized extensor tendon     Therapy precautions: Tendon precautions    History of injury/onset : gradually worsening elbow and wrist pain    Payor: Payor: BCBS /  /  /  Billing pattern: Commercial- substantial/midpoint time each CPT  Total Direct Treatment Time:60 min                       Total In Office Time: 70 min  Modifier needed: No  Episode visit count:  10     Preferred Name:  Jeana    SUBJECTIVE     Current Symptoms/Chief complaints:   Chief Complaint   Patient presents with    Wrist Pain    Elbow Pain     Having more wrist pain today versus elbow due to use.  OBJECTIVE     Functional Outcome Measures: Quick Dash  51 score=   90.91 % functional deficit at IE    Swelling/Edema: moderate elbow and wrist  R elbow: 26.2 cm  L elbow: 24.8 cm       RIGHT 4/9/24 RIGHT 4/29/24     Elbow extension/flexion 40/135°  0/150     Supination/Pronation 85/88 (end range tightness)°          RIGHT 4/9/24 LEFT  RIGHT 4/29/24    Wrist Extension/Flexion 59/45°  66/69°  69/70    RD/UD °  °        Opposition (Kapandji): 10      Strength  Strength (psi): RIGHT  5/21/24 LEFT  5/21/24      Position II 27.8 44.4     Lat Pinch: 7.2 8.2     2pt pinch: 4.8 8.2     3pt pinch: 8.0 9.9          Treatment:   Fluidotherapy (82128) Therapist directed exercise in Fluidotherapy to right hand/wrist for preparation for tx and desensitization     Therapeutic exercise (36533) x 15 min:  Passive lengthening of the extensors in comfortable

## 2024-06-18 ENCOUNTER — TREATMENT (OUTPATIENT)
Age: 49
End: 2024-06-18
Payer: COMMERCIAL

## 2024-06-18 DIAGNOSIS — M25.521 RIGHT ELBOW PAIN: ICD-10-CM

## 2024-06-18 DIAGNOSIS — M25.531 RIGHT WRIST PAIN: ICD-10-CM

## 2024-06-18 DIAGNOSIS — M25.621 STIFFNESS OF RIGHT ELBOW JOINT: Primary | ICD-10-CM

## 2024-06-18 DIAGNOSIS — M25.631 STIFFNESS OF RIGHT WRIST JOINT: ICD-10-CM

## 2024-06-18 PROCEDURE — 97035 APP MDLTY 1+ULTRASOUND EA 15: CPT

## 2024-06-18 PROCEDURE — 97022 WHIRLPOOL THERAPY: CPT

## 2024-06-18 PROCEDURE — 97140 MANUAL THERAPY 1/> REGIONS: CPT

## 2024-06-18 PROCEDURE — 97110 THERAPEUTIC EXERCISES: CPT

## 2024-06-18 NOTE — PROGRESS NOTES
[facility]  [address] [place] [id number]  Dept: [phone]      Occupational Therapy Daily Note     Referring MD: Aramis Cruz MD    Diagnosis:     ICD-10-CM    1. Stiffness of right elbow joint  M25.621       2. Stiffness of right wrist joint  M25.631       3. Right wrist pain  M25.531       4. Right elbow pain  M25.521           Surgery: Date 3/28/24 Right Ultrasound-Guided Carpal Tunnel release, right lateral epicondyle debridement and Decompressive fasciotomy of extensor-supinator mass with debridement of devitalized extensor tendon     Therapy precautions: Tendon precautions    History of injury/onset : gradually worsening elbow and wrist pain    Payor: Payor: BCBS /  /  /  Billing pattern: Commercial- substantial/midpoint time each CPT  Total Direct Treatment Time: 38 min                       Total In Office Time: 53 min  Modifier needed: No  Episode visit count:  11     Preferred Name:  Jeana    SUBJECTIVE     Current Symptoms/Chief complaints:   Chief Complaint   Patient presents with    Arm Pain     Having more wrist pain today versus elbow due to use.  OBJECTIVE     Functional Outcome Measures: Quick Dash  51 score=   90.91 % functional deficit at IE    Swelling/Edema: moderate elbow and wrist  R elbow: 26.2 cm  L elbow: 24.8 cm       RIGHT 4/9/24 RIGHT 4/29/24     Elbow extension/flexion 40/135°  0/150     Supination/Pronation 85/88 (end range tightness)°          RIGHT 4/9/24 LEFT  RIGHT 4/29/24    Wrist Extension/Flexion 59/45°  66/69°  69/70    RD/UD °  °        Opposition (Kapandji): 10      Strength  Strength (psi): RIGHT  5/21/24 LEFT  5/21/24      Position II 27.8 44.4     Lat Pinch: 7.2 8.2     2pt pinch: 4.8 8.2     3pt pinch: 8.0 9.9          Treatment:   Fluidotherapy (88602) x 15 minTherapist directed exercise in Fluidotherapy to right hand/wrist for preparation for tx and desensitization     Therapeutic exercise (65993) x 15 min:  Passive

## 2024-07-17 ENCOUNTER — OFFICE VISIT (OUTPATIENT)
Dept: FAMILY MEDICINE CLINIC | Facility: CLINIC | Age: 49
End: 2024-07-17
Payer: COMMERCIAL

## 2024-07-17 VITALS
HEIGHT: 62 IN | TEMPERATURE: 97.5 F | BODY MASS INDEX: 30.91 KG/M2 | WEIGHT: 168 LBS | SYSTOLIC BLOOD PRESSURE: 127 MMHG | DIASTOLIC BLOOD PRESSURE: 77 MMHG | HEART RATE: 85 BPM | RESPIRATION RATE: 16 BRPM | OXYGEN SATURATION: 96 %

## 2024-07-17 DIAGNOSIS — F98.8 ATTENTION DEFICIT DISORDER (ADD) WITHOUT HYPERACTIVITY: ICD-10-CM

## 2024-07-17 DIAGNOSIS — I10 ESSENTIAL HYPERTENSION: ICD-10-CM

## 2024-07-17 DIAGNOSIS — F41.1 GAD (GENERALIZED ANXIETY DISORDER): ICD-10-CM

## 2024-07-17 LAB
ANION GAP SERPL CALC-SCNC: 10 MMOL/L (ref 9–18)
BUN SERPL-MCNC: 15 MG/DL (ref 6–23)
CALCIUM SERPL-MCNC: 9.3 MG/DL (ref 8.8–10.2)
CHLORIDE SERPL-SCNC: 104 MMOL/L (ref 98–107)
CO2 SERPL-SCNC: 26 MMOL/L (ref 20–28)
CREAT SERPL-MCNC: 0.63 MG/DL (ref 0.6–1.1)
GLUCOSE SERPL-MCNC: 100 MG/DL (ref 70–99)
POTASSIUM SERPL-SCNC: 3.8 MMOL/L (ref 3.5–5.1)
SODIUM SERPL-SCNC: 140 MMOL/L (ref 136–145)

## 2024-07-17 PROCEDURE — 3078F DIAST BP <80 MM HG: CPT | Performed by: FAMILY MEDICINE

## 2024-07-17 PROCEDURE — 3074F SYST BP LT 130 MM HG: CPT | Performed by: FAMILY MEDICINE

## 2024-07-17 PROCEDURE — 99214 OFFICE O/P EST MOD 30 MIN: CPT | Performed by: FAMILY MEDICINE

## 2024-07-17 RX ORDER — LISDEXAMFETAMINE DIMESYLATE 50 MG/1
50 CAPSULE ORAL DAILY
Qty: 30 CAPSULE | Refills: 0 | Status: SHIPPED | OUTPATIENT
Start: 2024-08-16 | End: 2024-09-15

## 2024-07-17 RX ORDER — ESCITALOPRAM OXALATE 5 MG/1
5 TABLET ORAL DAILY
Qty: 30 TABLET | Refills: 5 | Status: SHIPPED | OUTPATIENT
Start: 2024-07-17

## 2024-07-17 RX ORDER — LISINOPRIL 10 MG/1
10 TABLET ORAL DAILY
Qty: 90 TABLET | Refills: 1 | Status: SHIPPED | OUTPATIENT
Start: 2024-07-17

## 2024-07-17 RX ORDER — LISDEXAMFETAMINE DIMESYLATE 50 MG/1
50 CAPSULE ORAL DAILY
Qty: 30 CAPSULE | Refills: 0 | Status: SHIPPED | OUTPATIENT
Start: 2024-09-15 | End: 2024-10-15

## 2024-07-17 RX ORDER — LISDEXAMFETAMINE DIMESYLATE 50 MG/1
50 CAPSULE ORAL DAILY
Qty: 30 CAPSULE | Refills: 0 | Status: SHIPPED | OUTPATIENT
Start: 2024-10-15 | End: 2024-11-14

## 2024-07-17 NOTE — PROGRESS NOTES
Subjective:  Roxie Najera is a 48 y.o. female presents today for their semi-annual htn and anxiety visit. They are having no side effects and are doing well.  Also for ADD visit and refills  Systems review of cardiovascular and pulmonary systems reveal no complaints or pertinent positives.  Patient denies any pounding heart beats or rapid heart beat intervals, flushing, panic like attacks, headaches, dizzyness or flushing.  They have drug reistant hypertension, on 3 or more different medicaitons including one diuretic- No  No data recorded    How much are you exercising? Yes 3 d p w  Do you smoke? No  Are you taking your medicines as directed most every day? Yes  Do you follow a low sodium DASH diet or similar high blood pressure diet? Yes  Do you check your bp at home with an automated blood pressure device? Yes  If you don't have a home bp machine, you should purchase one at home and begin to check your pressure at home on a regular basis.  Your blood pressure goal is listed under the \"plan section\" below    Allergies   Allergen Reactions    Peanut-Containing Drug Products Anaphylaxis    Codeine Other (See Comments)    Adhesive Tape Itching and Rash      reports that she quit smoking about 10 years ago. Her smoking use included cigarettes. She has never been exposed to tobacco smoke. She has never used smokeless tobacco.  Current Outpatient Medications   Medication Sig Dispense Refill    escitalopram (LEXAPRO) 5 MG tablet Take 1 tablet by mouth daily 30 tablet 5    lisinopril (PRINIVIL;ZESTRIL) 10 MG tablet Take 1 tablet by mouth daily 90 tablet 1    [START ON 8/16/2024] lisdexamfetamine (VYVANSE) 50 MG capsule Take 1 capsule by mouth daily for 30 days. Max Daily Amount: 50 mg 30 capsule 0    [START ON 9/15/2024] lisdexamfetamine (VYVANSE) 50 MG capsule Take 1 capsule by mouth daily for 30 days. Max Daily Amount: 50 mg 30 capsule 0    [START ON 10/15/2024] lisdexamfetamine (VYVANSE) 50 MG capsule Take 1

## 2024-07-19 ENCOUNTER — TREATMENT (OUTPATIENT)
Age: 49
End: 2024-07-19
Payer: COMMERCIAL

## 2024-07-19 DIAGNOSIS — M25.531 RIGHT WRIST PAIN: ICD-10-CM

## 2024-07-19 DIAGNOSIS — M25.621 STIFFNESS OF RIGHT ELBOW JOINT: Primary | ICD-10-CM

## 2024-07-19 DIAGNOSIS — M25.631 STIFFNESS OF RIGHT WRIST JOINT: ICD-10-CM

## 2024-07-19 PROCEDURE — 97110 THERAPEUTIC EXERCISES: CPT | Performed by: OCCUPATIONAL THERAPIST

## 2024-07-19 PROCEDURE — 97035 APP MDLTY 1+ULTRASOUND EA 15: CPT | Performed by: OCCUPATIONAL THERAPIST

## 2024-07-19 PROCEDURE — 97140 MANUAL THERAPY 1/> REGIONS: CPT | Performed by: OCCUPATIONAL THERAPIST

## 2024-07-19 NOTE — PROGRESS NOTES
[facility]  [address] [place] [id number]  Dept: [phone]      Occupational Therapy Daily Note     Referring MD: Aramis Cruz MD    Diagnosis:     ICD-10-CM    1. Stiffness of right elbow joint  M25.621       2. Stiffness of right wrist joint  M25.631       3. Right wrist pain  M25.531             Surgery: Date 3/28/24 Right Ultrasound-Guided Carpal Tunnel release, right lateral epicondyle debridement and Decompressive fasciotomy of extensor-supinator mass with debridement of devitalized extensor tendon     Therapy precautions: Tendon precautions    History of injury/onset : gradually worsening elbow and wrist pain    Payor: Payor: BCBS /  /  /  Billing pattern: Commercial- substantial/midpoint time each CPT  Total Direct Treatment Time: 38 min                       Total In Office Time: 40 min  Modifier needed: No  Episode visit count:  12     Preferred Name:  Jeana    SUBJECTIVE     Current Symptoms/Chief complaints:   Chief Complaint   Patient presents with    Wrist Pain     New onset of dorsal wrist pain and inflammation, very concerned.    OBJECTIVE     Functional Outcome Measures: Quick Dash  51 score=   90.91 % functional deficit at IE       RIGHT 4/9/24 RIGHT 4/29/24     Elbow extension/flexion 40/135°  0/150     Supination/Pronation 85/88 (end range tightness)°          RIGHT 4/9/24 LEFT  RIGHT 4/29/24    Wrist Extension/Flexion 59/45°  66/69°  69/70    RD/UD °  °        Opposition (Kapandji): 10      Strength  Strength (psi): RIGHT  5/21/24 LEFT  5/21/24      Position II 27.8 44.4     Lat Pinch: 7.2 8.2     2pt pinch: 4.8 8.2     3pt pinch: 8.0 9.9          Treatment:      Therapeutic exercise (54554) x 15 min:    Kineseotaping for rest to EDC along the distal path. I strip distal to proximal, 50% stretch  RMO to the MF for MF MPJ extension relative to RF and IF, provide more rest to 4th dorsal compartment    Ultrasound (67744) x 8 minutes including set up

## 2024-07-21 ENCOUNTER — PATIENT MESSAGE (OUTPATIENT)
Dept: FAMILY MEDICINE CLINIC | Facility: CLINIC | Age: 49
End: 2024-07-21

## 2024-07-21 DIAGNOSIS — F98.8 ATTENTION DEFICIT DISORDER (ADD) WITHOUT HYPERACTIVITY: ICD-10-CM

## 2024-07-22 DIAGNOSIS — F98.8 ATTENTION DEFICIT DISORDER (ADD) WITHOUT HYPERACTIVITY: ICD-10-CM

## 2024-07-22 RX ORDER — LISDEXAMFETAMINE DIMESYLATE 50 MG/1
50 CAPSULE ORAL DAILY
Qty: 30 CAPSULE | Refills: 0 | Status: SHIPPED | OUTPATIENT
Start: 2024-08-22 | End: 2024-09-21

## 2024-07-22 RX ORDER — LISDEXAMFETAMINE DIMESYLATE 50 MG/1
50 CAPSULE ORAL DAILY
Qty: 30 CAPSULE | Refills: 0 | Status: SHIPPED | OUTPATIENT
Start: 2024-10-17 | End: 2024-07-22 | Stop reason: CLARIF

## 2024-07-22 RX ORDER — LISDEXAMFETAMINE DIMESYLATE 50 MG/1
50 CAPSULE ORAL DAILY
Qty: 30 CAPSULE | Refills: 0 | Status: SHIPPED | OUTPATIENT
Start: 2024-09-16 | End: 2024-07-22 | Stop reason: CLARIF

## 2024-07-22 RX ORDER — LISDEXAMFETAMINE DIMESYLATE 50 MG/1
50 CAPSULE ORAL DAILY
Qty: 30 CAPSULE | Refills: 0 | Status: SHIPPED | OUTPATIENT
Start: 2024-08-16 | End: 2024-07-22 | Stop reason: CLARIF

## 2024-07-22 RX ORDER — LISDEXAMFETAMINE DIMESYLATE 50 MG/1
50 CAPSULE ORAL DAILY
Qty: 30 CAPSULE | Refills: 0 | Status: SHIPPED | OUTPATIENT
Start: 2024-07-22 | End: 2024-08-21

## 2024-07-22 RX ORDER — LISDEXAMFETAMINE DIMESYLATE 50 MG/1
50 CAPSULE ORAL DAILY
Qty: 30 CAPSULE | Refills: 0 | Status: SHIPPED | OUTPATIENT
Start: 2024-09-22 | End: 2024-10-22

## 2024-07-22 NOTE — TELEPHONE ENCOUNTER
Patient called for a Medication Refill Request    Name of Medication : lisdexamfetamine (VYVANSE)     Strength of Medication: 50 MG capsule     Directions: Take 1 capsule by mouth daily     30 day or 90 day supply: 30 days    Preferred Pharmacy: [id number] - [address] -  [phone]     Last Appt. Date: 7/17/2024    Next Appt. Date: 9/27/2024     Additional Information For Provider: Pt stated she is completely out of the medication    Pharmacist stated there is no med refill available for July only sees the one for August and September.     Please advice...

## 2024-07-22 NOTE — TELEPHONE ENCOUNTER
From: Roxie Najera  To: Dr. Quan Perez  Sent: 2024 10:57 AM EDT  Subject: Vyvanse    I tried getting my medicine filled this morning. The pharmacist is stating that I don't have a valid prescription for July. She see the prescriptions that was sent in for Aug, Sept, Oct. However she wanted me to let you know that there will be an issue with the October refill also. She is stating October will  the same day it needs to be refilled? If someone could please review call in or resubmit my prescription on Monday I would greatly appreciate it. . You can also reach me on my cell to discuss @ [phone]. Thank you.

## 2024-07-25 ENCOUNTER — TREATMENT (OUTPATIENT)
Age: 49
End: 2024-07-25
Payer: COMMERCIAL

## 2024-07-25 DIAGNOSIS — M25.531 RIGHT WRIST PAIN: ICD-10-CM

## 2024-07-25 DIAGNOSIS — M25.631 STIFFNESS OF RIGHT WRIST JOINT: Primary | ICD-10-CM

## 2024-07-25 PROCEDURE — 97035 APP MDLTY 1+ULTRASOUND EA 15: CPT | Performed by: OCCUPATIONAL THERAPIST

## 2024-07-25 PROCEDURE — 97022 WHIRLPOOL THERAPY: CPT | Performed by: OCCUPATIONAL THERAPIST

## 2024-07-25 PROCEDURE — 97140 MANUAL THERAPY 1/> REGIONS: CPT | Performed by: OCCUPATIONAL THERAPIST

## 2024-07-26 NOTE — PROGRESS NOTES
[facility]  [address] [place] [id number]  Dept: [phone]      Occupational Therapy Daily Note     Referring MD: Aramis Cruz MD    Diagnosis:     ICD-10-CM    1. Stiffness of right wrist joint  M25.631       2. Right wrist pain  M25.531               Surgery: Date 3/28/24 Right Ultrasound-Guided Carpal Tunnel release, right lateral epicondyle debridement and Decompressive fasciotomy of extensor-supinator mass with debridement of devitalized extensor tendon     Therapy precautions: Tendon precautions    History of injury/onset : gradually worsening elbow and wrist pain    Payor: Payor: BCBS /  /  /  Billing pattern: Commercial- substantial/midpoint time each CPT  Total Direct Treatment Time: 38 min                       Total In Office Time: 40 min  Modifier needed: No  Episode visit count:  13     Preferred Name:  Jeana    SUBJECTIVE     Current Symptoms/Chief complaints:   Chief Complaint   Patient presents with    Wrist Pain     New onset of dorsal wrist pain and inflammation, very concerned.    OBJECTIVE     Functional Outcome Measures: Quick Dash  51 score=   90.91 % functional deficit at IE       RIGHT 4/9/24 RIGHT 4/29/24     Elbow extension/flexion 40/135°  0/150     Supination/Pronation 85/88 (end range tightness)°          RIGHT 4/9/24 LEFT  RIGHT 4/29/24    Wrist Extension/Flexion 59/45°  66/69°  69/70    RD/UD °  °        Opposition (Kapandji): 10      Strength  Strength (psi): RIGHT  5/21/24 LEFT  5/21/24      Position II 27.8 44.4     Lat Pinch: 7.2 8.2     2pt pinch: 4.8 8.2     3pt pinch: 8.0 9.9          Treatment:   Fluidotherapy (71106) Therapist directed exercise in Fluidotherapy to right hand/wrist for preparation for tx and desensitization     Therapeutic exercise (71846) x 5 min:    Kineseotaping for rest to EDC along the distal path. I strip distal to proximal, 50% stretch    Ultrasound (41707) x 8 minutes including set up and application of

## 2024-08-16 ENCOUNTER — OFFICE VISIT (OUTPATIENT)
Age: 49
End: 2024-08-16

## 2024-08-16 DIAGNOSIS — G56.01 RIGHT CARPAL TUNNEL SYNDROME: ICD-10-CM

## 2024-08-16 DIAGNOSIS — M77.11 RIGHT TENNIS ELBOW: ICD-10-CM

## 2024-08-16 DIAGNOSIS — M67.431 GANGLION CYST OF DORSUM OF RIGHT WRIST: Primary | ICD-10-CM

## 2024-08-17 NOTE — PROGRESS NOTES
Orthopaedic Hand Surgery Note    Name: Roxie Najera  Age: 48 y.o.  YOB: 1975  Gender: female  MRN: [id number]    Post Operative Visit: Irrigation And Debridement Right Lateral Epicondyle With Possible Tendon Reattachment - Right and CARPAL TUNNEL RELEASE ultra sound guided on the right - Right    HPI: Patient is status post Irrigation And Debridement Right Lateral Epicondyle With Possible Tendon Reattachment - Right and CARPAL TUNNEL RELEASE ultra sound guided on the right - Right on 3/28/2024. Patient reports no numbness or paresthesias, no pain of the lateral elbow, most of her pain is on the dorsal aspect of the wrist.    Physical Examination:  Well-healed surgical wounds. Sensation is intact in all fingers. Motor exam reveals no deficits.  Palpable mass on the dorsal aspect of the wrist, this is moderately tender especially with wrist extension.    Imaging:     right Wrist XR: AP, Lateral, Oblique views     Clinical Indication:  1. Ganglion cyst of dorsum of right wrist    2. Right tennis elbow    3. Right carpal tunnel syndrome           Report: AP, lateral, oblique x-ray wrist XRs demonstrates well-maintained joint spaces without evidence of fracture or dislocation    Impression: as above     Aramis Cruz MD     Ultrasound semination demonstrates a small ganglion cyst arising from the radiocarpal joint that lies deep to the extensor tendons but it displaces the extensor tendon slightly superficially    Assessment:   1. Ganglion cyst of dorsum of right wrist    2. Right tennis elbow    3. Right carpal tunnel syndrome         Status post Irrigation And Debridement Right Lateral Epicondyle With Possible Tendon Reattachment - Right and CARPAL TUNNEL RELEASE ultra sound guided on the right - Right on 3/28/2024    Plan:  We discussed the post operative course and progression.  Right dorsal wrist joint and ganglion cyst steroid injection, we discussed all treatment options in detail including

## 2024-10-16 ENCOUNTER — EVALUATION (OUTPATIENT)
Age: 49
End: 2024-10-16

## 2024-10-16 ENCOUNTER — OFFICE VISIT (OUTPATIENT)
Age: 49
End: 2024-10-16
Payer: COMMERCIAL

## 2024-10-16 DIAGNOSIS — M77.11 RIGHT TENNIS ELBOW: ICD-10-CM

## 2024-10-16 DIAGNOSIS — M67.431 GANGLION CYST OF DORSUM OF RIGHT WRIST: Primary | ICD-10-CM

## 2024-10-16 DIAGNOSIS — G56.01 RIGHT CARPAL TUNNEL SYNDROME: ICD-10-CM

## 2024-10-16 DIAGNOSIS — M25.521 RIGHT ELBOW PAIN: Primary | ICD-10-CM

## 2024-10-16 PROCEDURE — 99213 OFFICE O/P EST LOW 20 MIN: CPT | Performed by: ORTHOPAEDIC SURGERY

## 2024-10-16 NOTE — PROGRESS NOTES
Orthopaedic Hand Surgery Note    Name: Roxie Najera  Age: 49 y.o.  YOB: 1975  Gender: female  MRN: [id number]    Post Operative Visit: Irrigation And Debridement Right Lateral Epicondyle With Possible Tendon Reattachment - Right and CARPAL TUNNEL RELEASE ultra sound guided on the right - Right    HPI: Patient is status post Irrigation And Debridement Right Lateral Epicondyle With Possible Tendon Reattachment - Right and CARPAL TUNNEL RELEASE ultra sound guided on the right - Right on 3/28/2024. Patient reports the ganglion cyst decreased in size and it is not painful at this point after I gave her an injection, she has no issues with the carpal tunnel, she is having some pain on the lateral aspect of the elbow and she is concerned about it, and happens usually with heavy lifting.    Physical Examination:  Well-healed surgical wounds. Sensation is intact in all fingers. Motor exam reveals no deficits.  There is no tenderness palpation of the lateral epicondyle, there is some discomfort with palpation of the mobile wad, negative chairlift test, negative pain with resisted wrist extension, small palpable mass on the dorsal aspect of the right wrist which is not tender today.    Imaging:     none    Assessment:   1. Ganglion cyst of dorsum of right wrist    2. Right tennis elbow    3. Right carpal tunnel syndrome         Status post Irrigation And Debridement Right Lateral Epicondyle With Possible Tendon Reattachment - Right and CARPAL TUNNEL RELEASE ultra sound guided on the right - Right on 3/28/2024    Plan:  We discussed the post operative course and progression.  Activity as tolerated, strengthening as tolerated, we discussed that she may have some deconditioning of the forearm muscles, we will give her a conditioning program, there is absolutely no pain at the lateral epicondyle swab not concerned about recurrent lateral epicondylitis.  She will follow-up on an as-needed basis    Aramis

## 2024-10-16 NOTE — PROGRESS NOTES
Occupational Therapy Encounter No Charge    Patient seen briefly while in clinic this date for development of home exercise program (see below) for current presentation of increased EDC pain in the muscle belly .        Precautions of current conditions and prognosis were also reviewed with the patient this date.  Patient in agreement with therapist recommendations of pain free strengthening and PRE.  She will start with eccentric strengthening and progress to concentric.      Access Code: [email or web address]  URL: [email or web address]  Date: 10/16/2024  Prepared by: Fern Guido    Exercises  - Seated Scapular Retraction  - 5 x daily - 7 x weekly - 2-3 sets - 10 reps - 3 sec hold  - Seated Isometric Elbow Flexion  - 2 x daily - 7 x weekly - 1 sets - 10 reps - 10 sec hold  - Seated Isometric Elbow Extension  - 2 x daily - 7 x weekly - 1 sets - 10 reps - 10 sec hold  - Seated Eccentric Wrist Extension  - 5 x daily - 7 x weekly - 2 sets - 15 reps - 3 sec hold  - Seated Eccentric Wrist Flexion with Dumbbell  - 5 x daily - 7 x weekly - 2 sets - 15 reps - 3 sec hold  - Standing Wrist Flexion Stretch  - 5 x daily - 7 x weekly - 1 sets - 5 reps - 15 sec hold  - Seated Wrist Extension with Dumbbell  - 2 x daily - 7 x weekly - 2 sets - 15 reps - 2 sec hold  - Seated Wrist Flexion with Dumbbell  - 2 x daily - 7 x weekly - 2 sets - 15 reps - 2 sec hold  - Seated Wrist Radial Deviation with Dumbbell  - 2 x daily - 7 x weekly - 2 sets - 15 reps - 2 sec hold  - Forearm Pronation and Supination with Hammer  - 2 x daily - 7 x weekly - 2 sets - 15 reps - 3 sec hold  - Finger Extension with Putty  - 2 x daily - 7 x weekly - 2 sets - 15 reps - 3 sec hold

## 2024-10-17 ENCOUNTER — TELEMEDICINE (OUTPATIENT)
Dept: FAMILY MEDICINE CLINIC | Facility: CLINIC | Age: 49
End: 2024-10-17
Payer: COMMERCIAL

## 2024-10-17 DIAGNOSIS — F98.8 ATTENTION DEFICIT DISORDER (ADD) WITHOUT HYPERACTIVITY: Primary | ICD-10-CM

## 2024-10-17 PROCEDURE — 99213 OFFICE O/P EST LOW 20 MIN: CPT | Performed by: FAMILY MEDICINE

## 2024-10-17 RX ORDER — LISDEXAMFETAMINE DIMESYLATE 50 MG/1
50 CAPSULE ORAL DAILY
Qty: 30 CAPSULE | Refills: 0 | Status: SHIPPED | OUTPATIENT
Start: 2024-10-17 | End: 2024-11-16

## 2024-10-17 RX ORDER — LISDEXAMFETAMINE DIMESYLATE 50 MG/1
50 CAPSULE ORAL DAILY
Qty: 30 CAPSULE | Refills: 0 | Status: SHIPPED | OUTPATIENT
Start: 2024-11-16 | End: 2024-12-16

## 2024-10-17 RX ORDER — LISDEXAMFETAMINE DIMESYLATE 50 MG/1
50 CAPSULE ORAL DAILY
Qty: 30 CAPSULE | Refills: 0 | Status: SHIPPED | OUTPATIENT
Start: 2024-12-16 | End: 2025-01-15

## 2024-10-17 NOTE — PROGRESS NOTES
Subjective:    Roxie Najera is a 49 y.o. female Roxie Najera, was evaluated through a synchronous (real-time) audio-video encounter. The patient (or guardian if applicable) is aware that this is a billable service, which includes applicable co-pays. This Virtual Visit was conducted with patient's (and/or legal guardian's) consent. Patient identification was verified, and a caregiver was present when appropriate.   The patient was located at Other: work parking lot  Provider was located at Facility (Appt Dept): [address] [place] [address] [address]  [place] [address]  Confirm you are appropriately licensed, registered, or certified to deliver care in the state where the patient is located as indicated above. If you are not or unsure, please re-schedule the visit: Yes, I confirm.     Presenting for follow up of ADD.  Doing well, no side effects.    Allergies   Allergen Reactions    Peanut-Containing Drug Products Anaphylaxis    Codeine Other (See Comments)    Adhesive Tape Itching and Rash     Patient Active Problem List   Diagnosis    Pure hypercholesterolemia    GERD (gastroesophageal reflux disease)    Essential hypertension    DANTE (generalized anxiety disorder)    Bipolar disease, chronic (HCC)    ADD (attention deficit disorder)    Vertigo    History of colonoscopy    Left ureteral stone    Hormone replacement therapy    Numbness and tingling of right upper extremity    Right elbow pain    Right tennis elbow    Carpal tunnel syndrome of right wrist    Right lateral epicondylitis         Objective:      Respirations approximately 16-20 breaths per minute    Constitutional: [x] Appears well-developed and well-nourished [x] No apparent distress      [] Abnormal -     Mental status: [x] Alert and awake  [x] Oriented to person/place/time [x] Able to follow commands    [] Abnormal -     Eyes:   EOM    [x]  Normal    [] Abnormal -   Sclera  [x]  Normal    [] Abnormal -          Discharge [x]  None visible

## 2024-12-16 ENCOUNTER — HOSPITAL ENCOUNTER (OUTPATIENT)
Dept: MAMMOGRAPHY | Age: 49
Discharge: HOME | End: 2024-12-19
Payer: COMMERCIAL

## 2024-12-16 VITALS — BODY MASS INDEX: 30.91 KG/M2 | WEIGHT: 168 LBS | HEIGHT: 62 IN

## 2024-12-16 DIAGNOSIS — Z12.31 SCREENING MAMMOGRAM FOR BREAST CANCER: ICD-10-CM

## 2024-12-16 PROCEDURE — 77067 SCR MAMMO BI INCL CAD: CPT

## 2025-01-20 ENCOUNTER — OFFICE VISIT (OUTPATIENT)
Dept: FAMILY MEDICINE CLINIC | Facility: CLINIC | Age: 50
End: 2025-01-20
Payer: COMMERCIAL

## 2025-01-20 VITALS
WEIGHT: 170 LBS | DIASTOLIC BLOOD PRESSURE: 76 MMHG | SYSTOLIC BLOOD PRESSURE: 114 MMHG | HEART RATE: 102 BPM | RESPIRATION RATE: 16 BRPM | TEMPERATURE: 98.6 F | HEIGHT: 62 IN | OXYGEN SATURATION: 95 % | BODY MASS INDEX: 31.28 KG/M2

## 2025-01-20 DIAGNOSIS — F31.9 BIPOLAR DISEASE, CHRONIC (HCC): ICD-10-CM

## 2025-01-20 DIAGNOSIS — Z98.890 HISTORY OF COLONOSCOPY: ICD-10-CM

## 2025-01-20 DIAGNOSIS — K21.9 GASTROESOPHAGEAL REFLUX DISEASE WITHOUT ESOPHAGITIS: ICD-10-CM

## 2025-01-20 DIAGNOSIS — F41.1 GAD (GENERALIZED ANXIETY DISORDER): ICD-10-CM

## 2025-01-20 DIAGNOSIS — I10 ESSENTIAL HYPERTENSION: Primary | ICD-10-CM

## 2025-01-20 DIAGNOSIS — F98.8 ATTENTION DEFICIT DISORDER (ADD) WITHOUT HYPERACTIVITY: ICD-10-CM

## 2025-01-20 PROCEDURE — 99214 OFFICE O/P EST MOD 30 MIN: CPT | Performed by: FAMILY MEDICINE

## 2025-01-20 PROCEDURE — 3078F DIAST BP <80 MM HG: CPT | Performed by: FAMILY MEDICINE

## 2025-01-20 PROCEDURE — 3074F SYST BP LT 130 MM HG: CPT | Performed by: FAMILY MEDICINE

## 2025-01-20 RX ORDER — LISDEXAMFETAMINE DIMESYLATE 50 MG/1
50 CAPSULE ORAL DAILY
Qty: 30 CAPSULE | Refills: 0 | Status: SHIPPED | OUTPATIENT
Start: 2025-01-20 | End: 2025-02-19

## 2025-01-20 RX ORDER — LISINOPRIL 10 MG/1
10 TABLET ORAL DAILY
Qty: 90 TABLET | Refills: 1 | Status: SHIPPED | OUTPATIENT
Start: 2025-01-20

## 2025-01-20 RX ORDER — LISDEXAMFETAMINE DIMESYLATE 50 MG/1
50 CAPSULE ORAL DAILY
Qty: 30 CAPSULE | Refills: 0 | Status: SHIPPED | OUTPATIENT
Start: 2025-02-19 | End: 2025-03-21

## 2025-01-20 RX ORDER — ESCITALOPRAM OXALATE 5 MG/1
5 TABLET ORAL DAILY
Qty: 30 TABLET | Refills: 5 | Status: SHIPPED | OUTPATIENT
Start: 2025-01-20

## 2025-01-20 RX ORDER — LISDEXAMFETAMINE DIMESYLATE 50 MG/1
50 CAPSULE ORAL DAILY
Qty: 30 CAPSULE | Refills: 0 | Status: SHIPPED | OUTPATIENT
Start: 2025-03-21 | End: 2025-04-20

## 2025-01-20 SDOH — ECONOMIC STABILITY: FOOD INSECURITY: WITHIN THE PAST 12 MONTHS, YOU WORRIED THAT YOUR FOOD WOULD RUN OUT BEFORE YOU GOT MONEY TO BUY MORE.: NEVER TRUE

## 2025-01-20 SDOH — ECONOMIC STABILITY: FOOD INSECURITY: WITHIN THE PAST 12 MONTHS, THE FOOD YOU BOUGHT JUST DIDN'T LAST AND YOU DIDN'T HAVE MONEY TO GET MORE.: NEVER TRUE

## 2025-01-20 SDOH — ECONOMIC STABILITY: TRANSPORTATION INSECURITY
IN THE PAST 12 MONTHS, HAS THE LACK OF TRANSPORTATION KEPT YOU FROM MEDICAL APPOINTMENTS OR FROM GETTING MEDICATIONS?: NO

## 2025-01-20 SDOH — ECONOMIC STABILITY: TRANSPORTATION INSECURITY
IN THE PAST 12 MONTHS, HAS LACK OF TRANSPORTATION KEPT YOU FROM MEETINGS, WORK, OR FROM GETTING THINGS NEEDED FOR DAILY LIVING?: NO

## 2025-01-20 SDOH — ECONOMIC STABILITY: INCOME INSECURITY: IN THE LAST 12 MONTHS, WAS THERE A TIME WHEN YOU WERE NOT ABLE TO PAY THE MORTGAGE OR RENT ON TIME?: NO

## 2025-01-20 ASSESSMENT — PATIENT HEALTH QUESTIONNAIRE - PHQ9
SUM OF ALL RESPONSES TO PHQ QUESTIONS 1-9: 0
4. FEELING TIRED OR HAVING LITTLE ENERGY: NOT AT ALL
2. FEELING DOWN, DEPRESSED OR HOPELESS: NOT AT ALL
9. THOUGHTS THAT YOU WOULD BE BETTER OFF DEAD, OR OF HURTING YOURSELF: NOT AT ALL
SUM OF ALL RESPONSES TO PHQ QUESTIONS 1-9: 0
9. THOUGHTS THAT YOU WOULD BE BETTER OFF DEAD, OR OF HURTING YOURSELF: NOT AT ALL
SUM OF ALL RESPONSES TO PHQ QUESTIONS 1-9: 0
2. FEELING DOWN, DEPRESSED OR HOPELESS: NOT AT ALL
5. POOR APPETITE OR OVEREATING: NOT AT ALL
7. TROUBLE CONCENTRATING ON THINGS, SUCH AS READING THE NEWSPAPER OR WATCHING TELEVISION: NOT AT ALL
3. TROUBLE FALLING OR STAYING ASLEEP: NOT AT ALL
8. MOVING OR SPEAKING SO SLOWLY THAT OTHER PEOPLE COULD HAVE NOTICED. OR THE OPPOSITE, BEING SO FIGETY OR RESTLESS THAT YOU HAVE BEEN MOVING AROUND A LOT MORE THAN USUAL: NOT AT ALL
8. MOVING OR SPEAKING SO SLOWLY THAT OTHER PEOPLE COULD HAVE NOTICED. OR THE OPPOSITE - BEING SO FIDGETY OR RESTLESS THAT YOU HAVE BEEN MOVING AROUND A LOT MORE THAN USUAL: NOT AT ALL
SUM OF ALL RESPONSES TO PHQ QUESTIONS 1-9: 0
SUM OF ALL RESPONSES TO PHQ9 QUESTIONS 1 & 2: 0
6. FEELING BAD ABOUT YOURSELF - OR THAT YOU ARE A FAILURE OR HAVE LET YOURSELF OR YOUR FAMILY DOWN: NOT AT ALL
7. TROUBLE CONCENTRATING ON THINGS, SUCH AS READING THE NEWSPAPER OR WATCHING TELEVISION: NOT AT ALL
10. IF YOU CHECKED OFF ANY PROBLEMS, HOW DIFFICULT HAVE THESE PROBLEMS MADE IT FOR YOU TO DO YOUR WORK, TAKE CARE OF THINGS AT HOME, OR GET ALONG WITH OTHER PEOPLE: NOT DIFFICULT AT ALL
3. TROUBLE FALLING OR STAYING ASLEEP: NOT AT ALL
10. IF YOU CHECKED OFF ANY PROBLEMS, HOW DIFFICULT HAVE THESE PROBLEMS MADE IT FOR YOU TO DO YOUR WORK, TAKE CARE OF THINGS AT HOME, OR GET ALONG WITH OTHER PEOPLE: NOT DIFFICULT AT ALL
SUM OF ALL RESPONSES TO PHQ QUESTIONS 1-9: 0
5. POOR APPETITE OR OVEREATING: NOT AT ALL
6. FEELING BAD ABOUT YOURSELF - OR THAT YOU ARE A FAILURE OR HAVE LET YOURSELF OR YOUR FAMILY DOWN: NOT AT ALL
1. LITTLE INTEREST OR PLEASURE IN DOING THINGS: NOT AT ALL
1. LITTLE INTEREST OR PLEASURE IN DOING THINGS: NOT AT ALL

## 2025-01-22 ENCOUNTER — LAB (OUTPATIENT)
Dept: FAMILY MEDICINE CLINIC | Facility: CLINIC | Age: 50
End: 2025-01-22

## 2025-01-22 DIAGNOSIS — I10 ESSENTIAL HYPERTENSION: ICD-10-CM

## 2025-01-22 LAB
ANION GAP SERPL CALC-SCNC: 9 MMOL/L (ref 7–16)
BUN SERPL-MCNC: 13 MG/DL (ref 6–23)
CALCIUM SERPL-MCNC: 9.2 MG/DL (ref 8.8–10.2)
CHLORIDE SERPL-SCNC: 103 MMOL/L (ref 98–107)
CO2 SERPL-SCNC: 28 MMOL/L (ref 20–29)
CREAT SERPL-MCNC: 0.69 MG/DL (ref 0.6–1.1)
GLUCOSE SERPL-MCNC: 102 MG/DL (ref 70–99)
POTASSIUM SERPL-SCNC: 4.4 MMOL/L (ref 3.5–5.1)
SODIUM SERPL-SCNC: 140 MMOL/L (ref 136–145)

## 2025-04-21 ENCOUNTER — TELEMEDICINE (OUTPATIENT)
Dept: FAMILY MEDICINE CLINIC | Facility: CLINIC | Age: 50
End: 2025-04-21
Payer: COMMERCIAL

## 2025-04-21 DIAGNOSIS — F98.8 ATTENTION DEFICIT DISORDER (ADD) WITHOUT HYPERACTIVITY: Primary | ICD-10-CM

## 2025-04-21 PROCEDURE — 99213 OFFICE O/P EST LOW 20 MIN: CPT | Performed by: FAMILY MEDICINE

## 2025-04-21 RX ORDER — LISDEXAMFETAMINE DIMESYLATE 50 MG/1
50 CAPSULE ORAL DAILY
Qty: 30 CAPSULE | Refills: 0 | Status: SHIPPED | OUTPATIENT
Start: 2025-06-27 | End: 2025-07-27

## 2025-04-21 RX ORDER — LISDEXAMFETAMINE DIMESYLATE 50 MG/1
50 CAPSULE ORAL DAILY
Qty: 30 CAPSULE | Refills: 0 | Status: SHIPPED | OUTPATIENT
Start: 2025-04-28 | End: 2025-05-28

## 2025-04-21 RX ORDER — LISDEXAMFETAMINE DIMESYLATE 50 MG/1
50 CAPSULE ORAL DAILY
Qty: 30 CAPSULE | Refills: 0 | Status: SHIPPED | OUTPATIENT
Start: 2025-05-28 | End: 2025-06-27

## 2025-04-21 NOTE — PROGRESS NOTES
Subjective:    Roxie Najera is a 49 y.o. female Roxie Najera, was evaluated through a synchronous (real-time) audio-video encounter. The patient (or guardian if applicable) is aware that this is a billable service, which includes applicable co-pays. This Virtual Visit was conducted with patient's (and/or legal guardian's) consent. Patient identification was verified, and a caregiver was present when appropriate.   The patient was located at Other: car  Provider was located at Facility (Appt Dept): [address] [place] [address] [address]  [place] [address]  Confirm you are appropriately licensed, registered, or certified to deliver care in the state where the patient is located as indicated above. If you are not or unsure, please re-schedule the visit: Yes, I confirm.     Presenting for follow up of ADD.  Doing well, no side effects.    Allergies   Allergen Reactions    Peanut-Containing Drug Products Anaphylaxis    Codeine Other (See Comments)    Adhesive Tape Itching and Rash     Patient Active Problem List   Diagnosis    Pure hypercholesterolemia    GERD (gastroesophageal reflux disease)    Essential hypertension    DANTE (generalized anxiety disorder)    Bipolar disease, chronic (HCC)    ADD (attention deficit disorder)    Vertigo    History of colonoscopy    Left ureteral stone    Hormone replacement therapy    Numbness and tingling of right upper extremity    Right elbow pain    Right tennis elbow    Carpal tunnel syndrome of right wrist    Right lateral epicondylitis       Objective:      Respirations approximately 16-20 breaths per minute    Constitutional: [x] Appears well-developed and well-nourished [x] No apparent distress      [] Abnormal -     Mental status: [x] Alert and awake  [x] Oriented to person/place/time [x] Able to follow commands    [] Abnormal -     Eyes:   EOM    [x]  Normal    [] Abnormal -   Sclera  [x]  Normal    [] Abnormal -          Discharge [x]  None visible   [] Abnormal -

## 2025-07-22 ENCOUNTER — OFFICE VISIT (OUTPATIENT)
Dept: FAMILY MEDICINE CLINIC | Facility: CLINIC | Age: 50
End: 2025-07-22
Payer: COMMERCIAL

## 2025-07-22 VITALS
RESPIRATION RATE: 18 BRPM | HEIGHT: 62 IN | OXYGEN SATURATION: 98 % | BODY MASS INDEX: 30.36 KG/M2 | DIASTOLIC BLOOD PRESSURE: 78 MMHG | HEART RATE: 83 BPM | SYSTOLIC BLOOD PRESSURE: 123 MMHG | TEMPERATURE: 97.7 F | WEIGHT: 165 LBS

## 2025-07-22 DIAGNOSIS — I10 ESSENTIAL HYPERTENSION: Primary | ICD-10-CM

## 2025-07-22 DIAGNOSIS — R73.9 BLOOD GLUCOSE ELEVATED: ICD-10-CM

## 2025-07-22 DIAGNOSIS — F31.9 BIPOLAR DISEASE, CHRONIC (HCC): ICD-10-CM

## 2025-07-22 DIAGNOSIS — F98.8 ATTENTION DEFICIT DISORDER (ADD) WITHOUT HYPERACTIVITY: ICD-10-CM

## 2025-07-22 DIAGNOSIS — F41.1 GAD (GENERALIZED ANXIETY DISORDER): ICD-10-CM

## 2025-07-22 PROCEDURE — 3074F SYST BP LT 130 MM HG: CPT | Performed by: FAMILY MEDICINE

## 2025-07-22 PROCEDURE — 99214 OFFICE O/P EST MOD 30 MIN: CPT | Performed by: FAMILY MEDICINE

## 2025-07-22 PROCEDURE — 3078F DIAST BP <80 MM HG: CPT | Performed by: FAMILY MEDICINE

## 2025-07-22 RX ORDER — LISDEXAMFETAMINE DIMESYLATE 50 MG/1
50 CAPSULE ORAL DAILY
Qty: 30 CAPSULE | Refills: 0 | Status: SHIPPED | OUTPATIENT
Start: 2025-09-27 | End: 2025-10-27

## 2025-07-22 RX ORDER — ESCITALOPRAM OXALATE 5 MG/1
5 TABLET ORAL DAILY
Qty: 30 TABLET | Refills: 5 | Status: SHIPPED | OUTPATIENT
Start: 2025-07-22

## 2025-07-22 RX ORDER — LISINOPRIL 10 MG/1
10 TABLET ORAL DAILY
Qty: 90 TABLET | Refills: 1 | Status: SHIPPED | OUTPATIENT
Start: 2025-07-22

## 2025-07-22 RX ORDER — LISDEXAMFETAMINE DIMESYLATE 50 MG/1
50 CAPSULE ORAL DAILY
Qty: 30 CAPSULE | Refills: 0 | Status: SHIPPED | OUTPATIENT
Start: 2025-08-28 | End: 2025-09-27

## 2025-07-22 RX ORDER — LISDEXAMFETAMINE DIMESYLATE 50 MG/1
50 CAPSULE ORAL DAILY
Qty: 30 CAPSULE | Refills: 0 | Status: SHIPPED | OUTPATIENT
Start: 2025-07-29 | End: 2025-08-28

## 2025-07-22 ASSESSMENT — PATIENT HEALTH QUESTIONNAIRE - PHQ9
SUM OF ALL RESPONSES TO PHQ QUESTIONS 1-9: 0
1. LITTLE INTEREST OR PLEASURE IN DOING THINGS: NOT AT ALL
2. FEELING DOWN, DEPRESSED OR HOPELESS: NOT AT ALL

## 2025-07-22 NOTE — PROGRESS NOTES
Subjective:  Roxie Najera is a 49 y.o. female presents today for their semi-annual htn, mood and ADD visit. They are having no side effects and are doing well.  PHQ-9 Total Score: 0 (7/22/2025  9:30 AM)  She has a concern has been getting consistent fasting fingerstick glucoses at home at her over 200.  This is interesting.  Nonfasting glucose here in January is 102.  Looking back over the last 5 years nonfasting glucoses here have primarily been in the  range.  Parents do not have diabetes.  She has been recently put on Creon for pancreatic insufficiency by GI.    Systems review of cardiovascular and pulmonary systems reveal no complaints or pertinent positives.  Patient denies any pounding heart beats or rapid heart beat intervals, flushing, panic like attacks, headaches, dizzyness or flushing.  They have drug reistant hypertension, on 3 or more different medicaitons including one diuretic- No    How much are you exercising? Yes 3 d p w  Do you smoke? No  Are you taking your medicines as directed most every day? Yes  Do you follow a low sodium DASH diet or similar high blood pressure diet? Yes  Do you check your bp at home with an automated blood pressure device? Yes  If you don't have a home bp machine, you should purchase one at home and begin to check your pressure at home on a regular basis.  Your blood pressure goal is listed under the \"plan section\" below    Allergies   Allergen Reactions    Peanut-Containing Drug Products Anaphylaxis    Codeine Other (See Comments)    Adhesive Tape Itching and Rash      reports that she quit smoking about 11 years ago. Her smoking use included cigarettes. She has never been exposed to tobacco smoke. She has never used smokeless tobacco.  Current Outpatient Medications   Medication Sig Dispense Refill    [START ON 7/29/2025] lisdexamfetamine (VYVANSE) 50 MG capsule Take 1 capsule by mouth daily for 30 days. Max Daily Amount: 50 mg 30 capsule 0    [START ON

## 2025-07-23 LAB
ANION GAP SERPL CALC-SCNC: 12 MMOL/L (ref 7–16)
BUN SERPL-MCNC: 17 MG/DL (ref 6–23)
CALCIUM SERPL-MCNC: 9.2 MG/DL (ref 8.8–10.2)
CHLORIDE SERPL-SCNC: 105 MMOL/L (ref 98–107)
CO2 SERPL-SCNC: 26 MMOL/L (ref 20–29)
CREAT SERPL-MCNC: 0.67 MG/DL (ref 0.6–1.1)
EST. AVERAGE GLUCOSE BLD GHB EST-MCNC: 118 MG/DL
GLUCOSE SERPL-MCNC: 109 MG/DL (ref 70–99)
HBA1C MFR BLD: 5.8 % (ref 0–5.6)
POTASSIUM SERPL-SCNC: 4.3 MMOL/L (ref 3.5–5.1)
SODIUM SERPL-SCNC: 142 MMOL/L (ref 136–145)

## (undated) DEVICE — BNDG,ELSTC,MATRIX,STRL,2"X5YD,LF,HOOK&LP: Brand: MEDLINE

## (undated) DEVICE — TROCAR: Brand: KII® OPTICAL ACCESS SYSTEM

## (undated) DEVICE — SUTURE SZ 0 27IN 5/8 CIR UR-6  TAPER PT VIOLET ABSRB VICRYL J603H

## (undated) DEVICE — BNDG,ELSTC,MATRIX,STRL,3"X5YD,LF,HOOK&LP: Brand: MEDLINE

## (undated) DEVICE — BLADE OPHTH DIA4MM MINI MENIS FLAT ARTHRO LOK

## (undated) DEVICE — STRIP,CLOSURE,WOUND,MEDI-STRIP,1/2X4: Brand: MEDLINE

## (undated) DEVICE — COTTON UNDERCAST PADDING,REGULAR FINISH: Brand: WEBRIL

## (undated) DEVICE — SUTURE VCRL SZ 0 L36IN ABSRB UD L36MM CT-1 1/2 CIR J946H

## (undated) DEVICE — KIT,ANTI FOG,W/SPONGE & FLUID,SOFT PACK: Brand: MEDLINE

## (undated) DEVICE — SX-ONE MICROKNIFE IS REBRANDED AS ULTRAGUIDECTR.  ULTRAGUIDECTR IS INTENDED TO TRANSECT THE TRANSVERSE CARPAL LIGAMENT FOR THE TREATMENT OF CARPAL TUNNEL SYNDROME.ULTRAGUIDECTR IS A DISPOSABLE DEVICE INTENDED TO CREATE SPACE WITHIN THE CARPAL TUNNEL AND TRANSECT THE TRANSVERSE CARPAL LIGAMENT (TCL) FOR THE TREATMENT OF CARPAL TUNNEL SYNDROME.: Brand: SX-ONE MICROKNIFE

## (undated) DEVICE — GENERAL LAPAROSCOPY: Brand: MEDLINE INDUSTRIES, INC.

## (undated) DEVICE — TROCARS: Brand: KII® BLUNT TIP ACCESS SYSTEM

## (undated) DEVICE — SUTURE NONABSORBABLE MONOFILAMENT 4-0 PS-2 18 IN BLU PROLENE 8682H

## (undated) DEVICE — HAND PACK: Brand: MEDLINE INDUSTRIES, INC.

## (undated) DEVICE — Z DISC USE 2220195 SUTURE VCRL SZ 4-0 L27IN ABSRB UD L19MM PS-2 3/8 CIR PRIM J426H

## (undated) DEVICE — (D)PREP SKN CHLRAPRP APPL 26ML -- CONVERT TO ITEM 371833

## (undated) DEVICE — TOPAZ EZ MICRODEBRIDER COBLATION WAND WITH INTEGRATED FINGER SWITCH IFS: Brand: COBLATION

## (undated) DEVICE — ZIMMER® STERILE DISPOSABLE TOURNIQUET CUFF WITH PLC, DUAL PORT, SINGLE BLADDER, 18 IN. (46 CM)

## (undated) DEVICE — [HIGH FLOW INSUFFLATOR,  DO NOT USE IF PACKAGE IS DAMAGED,  KEEP DRY,  KEEP AWAY FROM SUNLIGHT,  PROTECT FROM HEAT AND RADIOACTIVE SOURCES.]: Brand: PNEUMOSURE

## (undated) DEVICE — BNDG,ELSTC,MATRIX,STRL,4"X5YD,LF,HOOK&LP: Brand: MEDLINE

## (undated) DEVICE — SUTURE ENDOLOOP SZ 0 L18IN ABSRB VLT LIG SLDE KNOT VCRL

## (undated) DEVICE — TROCAR: Brand: KII FIOS FIRST ENTRY

## (undated) DEVICE — GLOVE ORANGE PI 7 1/2   MSG9075

## (undated) DEVICE — TOTAL 1-LAYER TRAY, LATEX FOLEY, 16FR 10: Brand: MEDLINE

## (undated) DEVICE — SHEARS ENDOSCP L36CM DIA5MM ULTRASONIC CRV TIP W/ ADV

## (undated) DEVICE — REM POLYHESIVE ADULT PATIENT RETURN ELECTRODE: Brand: VALLEYLAB

## (undated) DEVICE — PREP SKN CHLRAPRP APL 26ML STR --

## (undated) DEVICE — SUTURE MCRYL SZ 4-0 L27IN ABSRB UD L19MM PS-2 1/2 CIR PRIM Y426H

## (undated) DEVICE — SOLUTION IRRIG 1000ML 0.9% SOD CHL USP POUR PLAS BTL

## (undated) DEVICE — 2000CC GUARDIAN II: Brand: GUARDIAN

## (undated) DEVICE — GAUZE,SPONGE,2"X2",8PLY,STERILE,LF,2'S: Brand: MEDLINE

## (undated) DEVICE — BAG SPEC REM 224ML W4XL6IN DIA10MM 1 HND GYN DISP ENDOPCH

## (undated) DEVICE — DRESSING,GAUZE,XEROFORM,CURAD,1"X8",ST: Brand: CURAD

## (undated) DEVICE — SHEAR RMFG HARMONIC 5MMX36CM -- LAWSON OEM ITEM 322219

## (undated) DEVICE — TROCAR: Brand: KII® SLEEVE

## (undated) DEVICE — SUTURE STRATAFIX SPRL MCRYL + SZ 4-0 L12IN ABSRB UD PS-2 SXMP1B117

## (undated) DEVICE — GOWN,SIRUS,NONRNF,3XL,18/CS: Brand: MEDLINE

## (undated) DEVICE — LAPAROSCOPIC TROCAR SLEEVE/SINGLE USE: Brand: KII® OPTICAL ACCESS SYSTEM

## (undated) DEVICE — DISPOSABLE KIT FOR 1.8 MM Q-FIX                                    IMPLANT, INCLUDES DRILL, DRILL GUIDE                                    AND OBTURATOR: Brand: Q-FIX

## (undated) DEVICE — TUBING INSUFFLATION SMK EVAC HI FLO SET PNEUMOCLEAR

## (undated) DEVICE — NEEDLE HYPO 21GA L1.5IN INTRAMUSCULAR S STL LATCH BVL UP

## (undated) DEVICE — APPLICATOR MEDICATED 26 CC SOLUTION HI LT ORNG CHLORAPREP

## (undated) DEVICE — PADDING CAST W3INXL4YD COT BLEND MIC PLEAT UNDERCAST SPEC

## (undated) DEVICE — ELECTRODE PT RET AD L9FT HI MOIST COND ADH HYDRGEL CORDED

## (undated) DEVICE — SUTURE ABSRB X-1 REV CUT 1/2 CIR 22MM UD BRAID 27IN SZ 3-0 J458H

## (undated) DEVICE — INTENDED FOR TISSUE SEPARATION, AND OTHER PROCEDURES THAT REQUIRE A SHARP SURGICAL BLADE TO PUNCTURE OR CUT.: Brand: BARD-PARKER ®  SAFETY SCALPED

## (undated) DEVICE — BANDAGE GZ W2XL75IN ST RAYON POLY CNFRM STRTCH LTWT

## (undated) DEVICE — DRAPE, FILM SHEET, 44X65 STERILE: Brand: MEDLINE

## (undated) DEVICE — MASTISOL ADHESIVE LIQ 2/3ML

## (undated) DEVICE — DRAPE,HAND,STERILE: Brand: MEDLINE

## (undated) DEVICE — DERMABOND SKIN ADH 0.7ML -- DERMABOND ADVANCED 12/BX

## (undated) DEVICE — TUBING, SUCTION, 1/4" X 10', STRAIGHT: Brand: MEDLINE